# Patient Record
Sex: FEMALE | Race: ASIAN
[De-identification: names, ages, dates, MRNs, and addresses within clinical notes are randomized per-mention and may not be internally consistent; named-entity substitution may affect disease eponyms.]

---

## 2017-11-19 ENCOUNTER — HEALTH MAINTENANCE LETTER (OUTPATIENT)
Age: 42
End: 2017-11-19

## 2025-04-15 ENCOUNTER — HOSPITAL ENCOUNTER (INPATIENT)
Facility: HOSPITAL | Age: 50
DRG: 641 | End: 2025-04-15
Attending: EMERGENCY MEDICINE

## 2025-04-15 ENCOUNTER — APPOINTMENT (OUTPATIENT)
Dept: RADIOLOGY | Facility: HOSPITAL | Age: 50
DRG: 641 | End: 2025-04-15
Attending: EMERGENCY MEDICINE

## 2025-04-15 ENCOUNTER — APPOINTMENT (OUTPATIENT)
Dept: CT IMAGING | Facility: HOSPITAL | Age: 50
DRG: 641 | End: 2025-04-15
Attending: EMERGENCY MEDICINE

## 2025-04-15 DIAGNOSIS — M79.10 MYALGIA: ICD-10-CM

## 2025-04-15 DIAGNOSIS — R51.9 ACUTE INTRACTABLE HEADACHE, UNSPECIFIED HEADACHE TYPE: ICD-10-CM

## 2025-04-15 DIAGNOSIS — R79.89 ELEVATED BLOOD KETONE BODY LEVEL: ICD-10-CM

## 2025-04-15 DIAGNOSIS — R94.31 LONG QT INTERVAL: ICD-10-CM

## 2025-04-15 DIAGNOSIS — E16.2 HYPOGLYCEMIA: ICD-10-CM

## 2025-04-15 DIAGNOSIS — E87.29 HIGH ANION GAP METABOLIC ACIDOSIS: ICD-10-CM

## 2025-04-15 DIAGNOSIS — E87.20 LACTIC ACIDOSIS: ICD-10-CM

## 2025-04-15 DIAGNOSIS — K21.00 GASTROESOPHAGEAL REFLUX DISEASE WITH ESOPHAGITIS WITHOUT HEMORRHAGE: Primary | ICD-10-CM

## 2025-04-15 PROBLEM — Z59.9 FINANCIAL DIFFICULTIES: Status: ACTIVE | Noted: 2022-07-22

## 2025-04-15 PROBLEM — R10.9 ABDOMINAL PAIN: Status: ACTIVE | Noted: 2023-06-08

## 2025-04-15 PROBLEM — R73.9 HYPERGLYCEMIA: Status: ACTIVE | Noted: 2023-06-08

## 2025-04-15 PROBLEM — T73.0XXA STARVATION KETOACIDOSIS: Status: ACTIVE | Noted: 2023-06-08

## 2025-04-15 LAB
ALBUMIN SERPL BCG-MCNC: 5.2 G/DL (ref 3.5–5.2)
ALP SERPL-CCNC: 100 U/L (ref 40–150)
ALT SERPL W P-5'-P-CCNC: 46 U/L (ref 0–50)
ANION GAP SERPL CALCULATED.3IONS-SCNC: 23 MMOL/L (ref 7–15)
ANION GAP SERPL CALCULATED.3IONS-SCNC: 34 MMOL/L (ref 7–15)
AST SERPL W P-5'-P-CCNC: ABNORMAL U/L
B-OH-BUTYR SERPL-SCNC: 5.54 MMOL/L
BASE EXCESS BLDV CALC-SCNC: -17 MMOL/L (ref -3–3)
BASOPHILS # BLD AUTO: 0.1 10E3/UL (ref 0–0.2)
BASOPHILS NFR BLD AUTO: 1 %
BILIRUB DIRECT SERPL-MCNC: <0.08 MG/DL (ref 0–0.3)
BILIRUB SERPL-MCNC: 0.5 MG/DL
BUN SERPL-MCNC: 18 MG/DL (ref 6–20)
BUN SERPL-MCNC: 21.4 MG/DL (ref 6–20)
CALCIUM SERPL-MCNC: 8.1 MG/DL (ref 8.8–10.4)
CALCIUM SERPL-MCNC: 9.5 MG/DL (ref 8.8–10.4)
CHLORIDE SERPL-SCNC: 90 MMOL/L (ref 98–107)
CHLORIDE SERPL-SCNC: 97 MMOL/L (ref 98–107)
CK SERPL-CCNC: 190 U/L (ref 26–192)
CREAT SERPL-MCNC: 0.56 MG/DL (ref 0.51–0.95)
CREAT SERPL-MCNC: 0.62 MG/DL (ref 0.51–0.95)
EGFRCR SERPLBLD CKD-EPI 2021: >90 ML/MIN/1.73M2
EGFRCR SERPLBLD CKD-EPI 2021: >90 ML/MIN/1.73M2
EOSINOPHIL # BLD AUTO: 0 10E3/UL (ref 0–0.7)
EOSINOPHIL NFR BLD AUTO: 0 %
ERYTHROCYTE [DISTWIDTH] IN BLOOD BY AUTOMATED COUNT: 13.9 % (ref 10–15)
EST. AVERAGE GLUCOSE BLD GHB EST-MCNC: 103 MG/DL
ETHANOL SERPL-MCNC: 0.02 G/DL
FLUAV RNA SPEC QL NAA+PROBE: NEGATIVE
FLUBV RNA RESP QL NAA+PROBE: NEGATIVE
GLUCOSE BLD-MCNC: 47 MG/DL (ref 70–99)
GLUCOSE BLDC GLUCOMTR-MCNC: 117 MG/DL (ref 70–99)
GLUCOSE BLDC GLUCOMTR-MCNC: 152 MG/DL (ref 70–99)
GLUCOSE BLDC GLUCOMTR-MCNC: 174 MG/DL (ref 70–99)
GLUCOSE BLDC GLUCOMTR-MCNC: 178 MG/DL (ref 70–99)
GLUCOSE BLDC GLUCOMTR-MCNC: 182 MG/DL (ref 70–99)
GLUCOSE BLDC GLUCOMTR-MCNC: 59 MG/DL (ref 70–99)
GLUCOSE BLDC GLUCOMTR-MCNC: 68 MG/DL (ref 70–99)
GLUCOSE BLDC GLUCOMTR-MCNC: 84 MG/DL (ref 70–99)
GLUCOSE SERPL-MCNC: 170 MG/DL (ref 70–99)
GLUCOSE SERPL-MCNC: 49 MG/DL (ref 70–99)
HBA1C MFR BLD: 5.2 %
HCO3 BLDV-SCNC: 10 MMOL/L (ref 21–28)
HCO3 SERPL-SCNC: 10 MMOL/L (ref 22–29)
HCO3 SERPL-SCNC: 11 MMOL/L (ref 22–29)
HCT VFR BLD AUTO: 48.8 % (ref 35–47)
HGB BLD-MCNC: 16.1 G/DL (ref 11.7–15.7)
IMM GRANULOCYTES # BLD: 0 10E3/UL
IMM GRANULOCYTES NFR BLD: 0 %
LACTATE SERPL-SCNC: 9.2 MMOL/L (ref 0.7–2)
LIPASE SERPL-CCNC: 13 U/L (ref 13–60)
LIPASE SERPL-CCNC: 13 U/L (ref 13–60)
LYMPHOCYTES # BLD AUTO: 1.4 10E3/UL (ref 0.8–5.3)
LYMPHOCYTES NFR BLD AUTO: 12 %
MAGNESIUM SERPL-MCNC: 2 MG/DL (ref 1.7–2.3)
MAGNESIUM SERPL-MCNC: 2.3 MG/DL (ref 1.7–2.3)
MCH RBC QN AUTO: 30.2 PG (ref 26.5–33)
MCHC RBC AUTO-ENTMCNC: 33 G/DL (ref 31.5–36.5)
MCV RBC AUTO: 92 FL (ref 78–100)
MONOCYTES # BLD AUTO: 0.7 10E3/UL (ref 0–1.3)
MONOCYTES NFR BLD AUTO: 6 %
NEUTROPHILS # BLD AUTO: 9.9 10E3/UL (ref 1.6–8.3)
NEUTROPHILS NFR BLD AUTO: 81 %
NRBC # BLD AUTO: 0 10E3/UL
NRBC BLD AUTO-RTO: 0 /100
O2/TOTAL GAS SETTING VFR VENT: 21 %
OXYHGB MFR BLDV: 46 % (ref 70–75)
PCO2 BLDV: 28 MM HG (ref 40–50)
PH BLDV: 7.16 [PH] (ref 7.32–7.43)
PHOSPHATE SERPL-MCNC: 3.8 MG/DL (ref 2.5–4.5)
PHOSPHATE SERPL-MCNC: 4.9 MG/DL (ref 2.5–4.5)
PLATELET # BLD AUTO: 232 10E3/UL (ref 150–450)
PO2 BLDV: 33 MM HG (ref 25–47)
POTASSIUM SERPL-SCNC: 4.9 MMOL/L (ref 3.4–5.3)
POTASSIUM SERPL-SCNC: 5.4 MMOL/L (ref 3.4–5.3)
PROT SERPL-MCNC: 9.5 G/DL (ref 6.4–8.3)
RBC # BLD AUTO: 5.33 10E6/UL (ref 3.8–5.2)
RSV RNA SPEC NAA+PROBE: NEGATIVE
SALICYLATES SERPL-MCNC: <0.3 MG/DL
SAO2 % BLDV: 46.7 % (ref 70–75)
SARS-COV-2 RNA RESP QL NAA+PROBE: NEGATIVE
SODIUM SERPL-SCNC: 131 MMOL/L (ref 135–145)
SODIUM SERPL-SCNC: 134 MMOL/L (ref 135–145)
TROPONIN T SERPL HS-MCNC: 10 NG/L
TROPONIN T SERPL HS-MCNC: 12 NG/L
WBC # BLD AUTO: 12.2 10E3/UL (ref 4–11)

## 2025-04-15 PROCEDURE — 250N000009 HC RX 250: Performed by: EMERGENCY MEDICINE

## 2025-04-15 PROCEDURE — 82550 ASSAY OF CK (CPK): CPT | Performed by: EMERGENCY MEDICINE

## 2025-04-15 PROCEDURE — 36415 COLL VENOUS BLD VENIPUNCTURE: CPT | Performed by: EMERGENCY MEDICINE

## 2025-04-15 PROCEDURE — 82010 KETONE BODYS QUAN: CPT | Performed by: EMERGENCY MEDICINE

## 2025-04-15 PROCEDURE — 74177 CT ABD & PELVIS W/CONTRAST: CPT

## 2025-04-15 PROCEDURE — 85004 AUTOMATED DIFF WBC COUNT: CPT | Performed by: EMERGENCY MEDICINE

## 2025-04-15 PROCEDURE — 83735 ASSAY OF MAGNESIUM: CPT | Performed by: INTERNAL MEDICINE

## 2025-04-15 PROCEDURE — 200N000001 HC R&B ICU

## 2025-04-15 PROCEDURE — 82962 GLUCOSE BLOOD TEST: CPT

## 2025-04-15 PROCEDURE — 82310 ASSAY OF CALCIUM: CPT | Performed by: INTERNAL MEDICINE

## 2025-04-15 PROCEDURE — 83036 HEMOGLOBIN GLYCOSYLATED A1C: CPT | Performed by: EMERGENCY MEDICINE

## 2025-04-15 PROCEDURE — 71045 X-RAY EXAM CHEST 1 VIEW: CPT

## 2025-04-15 PROCEDURE — 96367 TX/PROPH/DG ADDL SEQ IV INF: CPT

## 2025-04-15 PROCEDURE — 82805 BLOOD GASES W/O2 SATURATION: CPT | Performed by: EMERGENCY MEDICINE

## 2025-04-15 PROCEDURE — 96375 TX/PRO/DX INJ NEW DRUG ADDON: CPT

## 2025-04-15 PROCEDURE — 71275 CT ANGIOGRAPHY CHEST: CPT

## 2025-04-15 PROCEDURE — 96365 THER/PROPH/DIAG IV INF INIT: CPT

## 2025-04-15 PROCEDURE — 96376 TX/PRO/DX INJ SAME DRUG ADON: CPT

## 2025-04-15 PROCEDURE — 96361 HYDRATE IV INFUSION ADD-ON: CPT

## 2025-04-15 PROCEDURE — 80179 DRUG ASSAY SALICYLATE: CPT | Performed by: EMERGENCY MEDICINE

## 2025-04-15 PROCEDURE — 250N000013 HC RX MED GY IP 250 OP 250 PS 637: Performed by: INTERNAL MEDICINE

## 2025-04-15 PROCEDURE — 83930 ASSAY OF BLOOD OSMOLALITY: CPT | Performed by: EMERGENCY MEDICINE

## 2025-04-15 PROCEDURE — 87040 BLOOD CULTURE FOR BACTERIA: CPT | Performed by: EMERGENCY MEDICINE

## 2025-04-15 PROCEDURE — 87637 SARSCOV2&INF A&B&RSV AMP PRB: CPT | Performed by: EMERGENCY MEDICINE

## 2025-04-15 PROCEDURE — 84484 ASSAY OF TROPONIN QUANT: CPT | Performed by: EMERGENCY MEDICINE

## 2025-04-15 PROCEDURE — 83690 ASSAY OF LIPASE: CPT | Performed by: EMERGENCY MEDICINE

## 2025-04-15 PROCEDURE — 250N000011 HC RX IP 250 OP 636: Performed by: EMERGENCY MEDICINE

## 2025-04-15 PROCEDURE — 82077 ASSAY SPEC XCP UR&BREATH IA: CPT | Performed by: EMERGENCY MEDICINE

## 2025-04-15 PROCEDURE — 99222 1ST HOSP IP/OBS MODERATE 55: CPT | Performed by: INTERNAL MEDICINE

## 2025-04-15 PROCEDURE — 83735 ASSAY OF MAGNESIUM: CPT | Performed by: EMERGENCY MEDICINE

## 2025-04-15 PROCEDURE — 80307 DRUG TEST PRSMV CHEM ANLYZR: CPT | Performed by: EMERGENCY MEDICINE

## 2025-04-15 PROCEDURE — 84155 ASSAY OF PROTEIN SERUM: CPT | Performed by: EMERGENCY MEDICINE

## 2025-04-15 PROCEDURE — 80048 BASIC METABOLIC PNL TOTAL CA: CPT | Performed by: EMERGENCY MEDICINE

## 2025-04-15 PROCEDURE — 258N000003 HC RX IP 258 OP 636: Performed by: EMERGENCY MEDICINE

## 2025-04-15 PROCEDURE — 84100 ASSAY OF PHOSPHORUS: CPT | Performed by: INTERNAL MEDICINE

## 2025-04-15 PROCEDURE — 93005 ELECTROCARDIOGRAM TRACING: CPT | Performed by: EMERGENCY MEDICINE

## 2025-04-15 PROCEDURE — 258N000001 HC RX 258: Performed by: EMERGENCY MEDICINE

## 2025-04-15 PROCEDURE — 36415 COLL VENOUS BLD VENIPUNCTURE: CPT | Performed by: INTERNAL MEDICINE

## 2025-04-15 PROCEDURE — 99291 CRITICAL CARE FIRST HOUR: CPT | Mod: 25

## 2025-04-15 PROCEDURE — 70496 CT ANGIOGRAPHY HEAD: CPT

## 2025-04-15 RX ORDER — DEXTROSE MONOHYDRATE AND SODIUM CHLORIDE 5; .45 G/100ML; G/100ML
1000 INJECTION, SOLUTION INTRAVENOUS CONTINUOUS PRN
Status: DISCONTINUED | OUTPATIENT
Start: 2025-04-15 | End: 2025-04-17

## 2025-04-15 RX ORDER — VANCOMYCIN HYDROCHLORIDE 1 G/200ML
20 INJECTION, SOLUTION INTRAVENOUS ONCE
Status: DISCONTINUED | OUTPATIENT
Start: 2025-04-15 | End: 2025-04-15

## 2025-04-15 RX ORDER — AMOXICILLIN 250 MG
2 CAPSULE ORAL 2 TIMES DAILY PRN
Status: DISCONTINUED | OUTPATIENT
Start: 2025-04-15 | End: 2025-04-18 | Stop reason: HOSPADM

## 2025-04-15 RX ORDER — MORPHINE SULFATE 2 MG/ML
2 INJECTION, SOLUTION INTRAMUSCULAR; INTRAVENOUS
Status: DISCONTINUED | OUTPATIENT
Start: 2025-04-15 | End: 2025-04-18 | Stop reason: HOSPADM

## 2025-04-15 RX ORDER — SODIUM CHLORIDE 9 MG/ML
INJECTION, SOLUTION INTRAVENOUS CONTINUOUS
Status: DISCONTINUED | OUTPATIENT
Start: 2025-04-15 | End: 2025-04-16

## 2025-04-15 RX ORDER — PIPERACILLIN SODIUM, TAZOBACTAM SODIUM 3; .375 G/15ML; G/15ML
3.38 INJECTION, POWDER, LYOPHILIZED, FOR SOLUTION INTRAVENOUS ONCE
Status: COMPLETED | OUTPATIENT
Start: 2025-04-15 | End: 2025-04-15

## 2025-04-15 RX ORDER — ACETAMINOPHEN 650 MG/1
650 SUPPOSITORY RECTAL EVERY 4 HOURS PRN
Status: DISCONTINUED | OUTPATIENT
Start: 2025-04-15 | End: 2025-04-18 | Stop reason: HOSPADM

## 2025-04-15 RX ORDER — ONDANSETRON 2 MG/ML
4 INJECTION INTRAMUSCULAR; INTRAVENOUS EVERY 30 MIN PRN
Status: DISCONTINUED | OUTPATIENT
Start: 2025-04-15 | End: 2025-04-15

## 2025-04-15 RX ORDER — ONDANSETRON 4 MG/1
4 TABLET, ORALLY DISINTEGRATING ORAL EVERY 6 HOURS PRN
Status: DISCONTINUED | OUTPATIENT
Start: 2025-04-15 | End: 2025-04-18 | Stop reason: HOSPADM

## 2025-04-15 RX ORDER — LIDOCAINE 40 MG/G
CREAM TOPICAL
Status: DISCONTINUED | OUTPATIENT
Start: 2025-04-15 | End: 2025-04-18 | Stop reason: HOSPADM

## 2025-04-15 RX ORDER — IOPAMIDOL 755 MG/ML
90 INJECTION, SOLUTION INTRAVASCULAR ONCE
Status: COMPLETED | OUTPATIENT
Start: 2025-04-15 | End: 2025-04-15

## 2025-04-15 RX ORDER — ONDANSETRON 2 MG/ML
4 INJECTION INTRAMUSCULAR; INTRAVENOUS EVERY 6 HOURS PRN
Status: DISCONTINUED | OUTPATIENT
Start: 2025-04-15 | End: 2025-04-18 | Stop reason: HOSPADM

## 2025-04-15 RX ORDER — AMOXICILLIN 250 MG
1 CAPSULE ORAL 2 TIMES DAILY PRN
Status: DISCONTINUED | OUTPATIENT
Start: 2025-04-15 | End: 2025-04-18 | Stop reason: HOSPADM

## 2025-04-15 RX ORDER — ACETAMINOPHEN 325 MG/1
650 TABLET ORAL ONCE
Status: COMPLETED | OUTPATIENT
Start: 2025-04-15 | End: 2025-04-15

## 2025-04-15 RX ORDER — DEXTROSE MONOHYDRATE 25 G/50ML
25 INJECTION, SOLUTION INTRAVENOUS ONCE
Status: COMPLETED | OUTPATIENT
Start: 2025-04-15 | End: 2025-04-15

## 2025-04-15 RX ORDER — DEXTROSE MONOHYDRATE AND SODIUM CHLORIDE 5; .45 G/100ML; G/100ML
1000 INJECTION, SOLUTION INTRAVENOUS CONTINUOUS
Status: DISCONTINUED | OUTPATIENT
Start: 2025-04-15 | End: 2025-04-16

## 2025-04-15 RX ORDER — ACETAMINOPHEN 325 MG/1
650 TABLET ORAL EVERY 4 HOURS PRN
Status: DISCONTINUED | OUTPATIENT
Start: 2025-04-15 | End: 2025-04-18 | Stop reason: HOSPADM

## 2025-04-15 RX ORDER — DEXTROSE MONOHYDRATE 25 G/50ML
25-50 INJECTION, SOLUTION INTRAVENOUS
Status: DISCONTINUED | OUTPATIENT
Start: 2025-04-15 | End: 2025-04-18 | Stop reason: HOSPADM

## 2025-04-15 RX ORDER — IBUPROFEN 600 MG/1
600 TABLET, FILM COATED ORAL EVERY 6 HOURS PRN
Status: ON HOLD | COMMUNITY
Start: 2025-01-10 | End: 2025-04-18

## 2025-04-15 RX ADMIN — IOPAMIDOL 90 ML: 755 INJECTION, SOLUTION INTRAVENOUS at 22:07

## 2025-04-15 RX ADMIN — SODIUM CHLORIDE 1000 ML: 0.9 INJECTION, SOLUTION INTRAVENOUS at 21:47

## 2025-04-15 RX ADMIN — DEXTROSE MONOHYDRATE 25 ML: 25 INJECTION, SOLUTION INTRAVENOUS at 19:29

## 2025-04-15 RX ADMIN — SODIUM CHLORIDE 1250 MG: 0.9 INJECTION, SOLUTION INTRAVENOUS at 21:57

## 2025-04-15 RX ADMIN — INSULIN HUMAN 1 UNITS/HR: 1 INJECTION, SOLUTION INTRAVENOUS at 23:11

## 2025-04-15 RX ADMIN — PIPERACILLIN AND TAZOBACTAM 3.38 G: 3; .375 INJECTION, POWDER, FOR SOLUTION INTRAVENOUS at 21:29

## 2025-04-15 RX ADMIN — ONDANSETRON 4 MG: 2 INJECTION, SOLUTION INTRAMUSCULAR; INTRAVENOUS at 19:39

## 2025-04-15 RX ADMIN — ACETAMINOPHEN 650 MG: 325 TABLET ORAL at 23:15

## 2025-04-15 RX ADMIN — SODIUM CHLORIDE: 0.9 INJECTION, SOLUTION INTRAVENOUS at 19:52

## 2025-04-15 RX ADMIN — DEXTROSE AND SODIUM CHLORIDE 1000 ML: 5; 450 INJECTION, SOLUTION INTRAVENOUS at 22:32

## 2025-04-15 RX ADMIN — MORPHINE SULFATE 2 MG: 2 INJECTION, SOLUTION INTRAMUSCULAR; INTRAVENOUS at 19:58

## 2025-04-15 RX ADMIN — MORPHINE SULFATE 2 MG: 2 INJECTION, SOLUTION INTRAMUSCULAR; INTRAVENOUS at 19:37

## 2025-04-15 RX ADMIN — DEXTROSE MONOHYDRATE 25 ML: 25 INJECTION, SOLUTION INTRAVENOUS at 22:02

## 2025-04-15 ASSESSMENT — COLUMBIA-SUICIDE SEVERITY RATING SCALE - C-SSRS
6. HAVE YOU EVER DONE ANYTHING, STARTED TO DO ANYTHING, OR PREPARED TO DO ANYTHING TO END YOUR LIFE?: NO
2. HAVE YOU ACTUALLY HAD ANY THOUGHTS OF KILLING YOURSELF IN THE PAST MONTH?: NO
1. IN THE PAST MONTH, HAVE YOU WISHED YOU WERE DEAD OR WISHED YOU COULD GO TO SLEEP AND NOT WAKE UP?: NO

## 2025-04-15 ASSESSMENT — ACTIVITIES OF DAILY LIVING (ADL)
ADLS_ACUITY_SCORE: 41

## 2025-04-15 NOTE — ED TRIAGE NOTES
Pt endorses headache, neck pain, and back pain that started yesterday morning. Pt is moaning in pain in triage. Pt also endorses bilateral hand numbness and bilateral knee pain, however those have both been present for the last 2-3 years. Pt endorses light-headedness, but denies room spinning dizziness or vision changes.      Triage Assessment (Adult)       Row Name 04/15/25 1199          Triage Assessment    Airway WDL WDL        Respiratory WDL    Respiratory WDL WDL        Skin Circulation/Temperature WDL    Skin Circulation/Temperature WDL WDL        Cardiac WDL    Cardiac WDL WDL        Peripheral/Neurovascular WDL    Peripheral Neurovascular WDL WDL        Cognitive/Neuro/Behavioral WDL    Cognitive/Neuro/Behavioral WDL WDL

## 2025-04-15 NOTE — ED PROVIDER NOTES
Emergency Department Encounter     Evaluation Date & Time:   4/15/2025  6:50 PM    CHIEF COMPLAINT:  Headache, Neck Pain, and Back Pain      Triage Note:Pt endorses headache, neck pain, and back pain that started yesterday morning. Pt is moaning in pain in triage. Pt also endorses bilateral hand numbness and bilateral knee pain, however those have both been present for the last 2-3 years. Pt endorses light-headedness, but denies room spinning dizziness or vision changes.      Triage Assessment (Adult)       Row Name 04/15/25 1718          Triage Assessment    Airway WDL WDL        Respiratory WDL    Respiratory WDL WDL        Skin Circulation/Temperature WDL    Skin Circulation/Temperature WDL WDL        Cardiac WDL    Cardiac WDL WDL        Peripheral/Neurovascular WDL    Peripheral Neurovascular WDL WDL        Cognitive/Neuro/Behavioral WDL    Cognitive/Neuro/Behavioral WDL WDL                         FINAL IMPRESSION:    ICD-10-CM    1. High anion gap metabolic acidosis  E87.29       2. Elevated blood ketone body level  R79.89       3. Hypoglycemia  E16.2       4. Acute intractable headache, unspecified headache type  R51.9       5. Myalgia  M79.10       6. Lactic acidosis  E87.20     rule out septic shock      7. Long QT interval  R94.31           Impression and Plan     ED COURSE & MEDICAL DECISION MAKING:      ED Course as of 04/16/25 0008   Tue Apr 15, 2025   1932 So, I reviewed the patient's chart.  History from her is difficult not just because of language barrier but because she is having some difficulty expressing what she is feeling.  It sounds like the symptoms are new within the last couple of days with headache and at least cough and cold so this may be sort of an infectious syndrome but she demonstrates no signs of meningismus.    She is certainly tachypneic and appears uncomfortable and so do have to consider the possibility of DKA but I had the nursing staff check her blood sugar and if anything  is low.  Her EKG was just handed to me and has quite a late peaking T wave suspicious for acutely low calcium and/or magnesium.  Those are being checked.  However, with her headache and high blood pressure do also have to consider something like subarachnoid hemorrhage or intracranial hemorrhage.  Her neuro examination however is unremarkable.  She has no focal findings on it.  And, she is quite awake and I saw her change close independently standing at the bedside and to climb into bed as she is quite a petite woman and had to actually climb up and into bed without difficulty so I think an acute neurologic disorder is unlikely but the headache is more likely a result of something else.  She does appear little bit dehydrated but prior to head and imaging with her symptoms I would be slow on giving fluids.    Differential is not limited to the above but will start with that evaluation.  I think like very likely the patient may need to be admitted to the hospital for new onset of symptoms unless she improves markedly or if we find an obvious cause like influenza or COVID.   2006 Patient's blood sugar was actually a bit low so she got a little bump of dextrose which brought her blood sugar but since then her pH has come back quite low.  It does not appear to be respiratory as her pCO2 is adequate but her bicarbonate is low.  She tells me she only takes ibuprofen for her headaches but I will add on salicylate level.  Really not respiratory alkalosis, though.  Appears to be primarily metabolic.   2012 Labs returning with elevated lactic acid.  No source of infection found but as she has a lactic acidosis in the setting of complaint of all over body aches and pains, will call code sepsis and started on antibiotics for undifferentiated sepsis then continue the plan for CAT scan of her head with follow-through throughout the rest of her body.  She demonstrates no signs of meningitis.  I did review her chart and found the  admission in 2023 which was very similar and they did consult endocrine who felt this was nondiabetic anion gap acidosis.  Per her son she does not drink alcohol.   2018 No evidence of uremia, toxic alcohols, or even clear cause of her lactic acidosis.   2056 Patient is in CT now.  Will see therefore shortly if there is any space-occupying lesion in her brain or signs of hydrocephalus.  If not we will discuss with nephrology fluids recommended.   2101 Previous endocrine consult notesfrom that hospitalization initial note in 06/2023:  Diabetes history: NEW Diabetes - presenting with metabolic acidosis with glucose of 142.  NO History of recent SGLT-2 inh.  Glucose peak low 200s. And A1C came back at 5.1%.    Inpatient glucose: received glargine 13 & DKA protocol now off insulin drip   Outpatient medication regimen: no diabetes meds.  Only tylenol at home.       However, in the end they did not discharge on insulin and the final diagnosis was not officially made for diabetes   2128 I reviewed her CT scan of her head.  No obvious brain occupying lesion or signs of severe hydrocephalus so will be given additional fluid bolus.  I discussed the case with Dr. Hutchinson with critical care and she agrees with starting the patient on both insulin and dextrose for what may end up being euglycemic DKA or even if not she likely needs both to close the gap.  Then I spoke with Dr. Harsh Mendieta with nephrology and he agrees this is a very odd presentation diagnosis of diabetes is not clear but with the ketones he agrees with starting on dextrose and then as she did well on last admission with the insulin protocol and fluids would agree with doing those as well.  We discussed that this may end up being an atypical derangement of her hepatic storage pathways and may need referral to a specialist for that.   2238 I spoke with the hospitalist.  They agree with continuing insulin drip for now.  They are putting in additional orders.  They  agree with admitting to the ICU.  She remains as a rule out sepsis.    Current to reexamination for perfusion is not changed from arrival.    I have reviewed the radiology results above as noted, but final radiology read of the CAT scan of chest abdomen pelvis as well as the head and neck is still pending.  Hospitalist is aware and will continue to watch for those but the patient is going to the ICU regardless.       At the conclusion of the encounter I discussed the results of all the tests and the disposition. The questions were answered. The patient or family acknowledged understanding and was agreeable with the care plan.          60 minutes of critical care time        MEDICATIONS GIVEN IN THE EMERGENCY DEPARTMENT:  Medications   sodium chloride 0.9 % infusion (0 mLs Intravenous Stopped 4/15/25 2146)   ondansetron (ZOFRAN) injection 4 mg (4 mg Intravenous $Given 4/15/25 1939)   morphine (PF) injection 2 mg (2 mg Intravenous $Given 4/15/25 1958)   vancomycin (VANCOCIN) 1,250 mg in 0.9% NaCl 262.5 mL intermittent infusion (1,250 mg Intravenous $New Bag 4/15/25 2157)   dextrose 5% and 0.45% NaCl infusion (1,000 mLs Intravenous $New Bag 4/15/25 2232)   dextrose 50 % injection 25-50 mL (25 mLs Intravenous $Given 4/15/25 2202)   dextrose 5% and 0.45% NaCl infusion (0 mLs Intravenous Hold 4/15/25 2211)   insulin regular (MYXREDLIN) 1 unit/mL infusion (has no administration in time range)   dextrose 50 % injection 25 mL (25 mLs Intravenous $Given 4/15/25 1929)   piperacillin-tazobactam (ZOSYN) 3.375 g vial to attach to  mL bag (0 g Intravenous Stopped 4/15/25 2212)   sodium chloride 0.9% BOLUS 1,000 mL (1,000 mLs Intravenous $New Bag 4/15/25 2147)   iopamidol (ISOVUE-370) solution 90 mL (90 mLs Intravenous $Given 4/15/25 2207)       NEW PRESCRIPTIONS STARTED AT TODAY'S ED VISIT:  Current Discharge Medication List          HPI     HPI     Naw Put is a 49 year old female with a pertinent history of chronic pain in  joints who presents to this ED with her sons for evaluation of headache, body aches, and joint pains .  Seen with xiomara romeroer via telemedicine.  She notes she doesn't get headaches every day but often.  She has ibuprofen 800mg per her son to take for her headaches.  She took her medicine for this at about 1pm .  Headache is bilateral temporal area but otherwise through the  she tells me it is hard to describe her headaches and when they occur.  She confirms this one started this am.  She sees her pmd for this issue, Dr. Cervantes.    She has numbness and tingling on hands and legs and aching pain in her legs for which she wears knee braces that she is wearing today.    Per her son, the joint pains/bodynumbness is common but the headache is a bit new.  She fowler shave a scratchy throat and a bit of a cough, but is unclear if they are related, but they are also new in the last couple of days with the headache.  She notes no known sick contacts or travel.    She has an admission previously for hyperglycemia/dka without formal diagnosis of diabetes per her office visit from Dr Cervantes on 8/9/24 on my review.    REVIEW OF SYSTEMS:  Review of Systems  remainder of systems are all otherwise negative.        Medical History     No past medical history on file.per office visit hypokalemia, chronic knee /joint pain, hyperglycemia with dka previously.  Contraceptives.    No past surgical history on file.    No family history on file.    Social History     Tobacco Use    Smoking status: Every Day     Types: Cigarettes       No current outpatient medications on file.      Physical Exam     First Vitals:  Patient Vitals for the past 24 hrs:   BP Temp Temp src Pulse Resp SpO2 Height Weight   04/15/25 2220 (!) 141/67 -- -- 98 27 100 % -- --   04/15/25 2210 135/67 -- -- 101 21 99 % -- --   04/15/25 2130 (!) 148/73 -- -- 95 (!) 31 100 % -- --   04/15/25 2020 (!) 146/82 -- -- 104 22 98 % -- --   04/15/25 2000 (!) 154/87 -- --  106 (!) 31 98 % -- --   04/15/25 1940 (!) 145/81 -- -- (!) 131 (!) 42 98 % -- --   04/15/25 1930 (!) 155/90 -- -- 102 22 100 % -- --   04/15/25 1920 (!) 141/81 -- -- 107 -- 99 % -- --   04/15/25 1900 (!) 183/85 -- -- 109 -- 98 % -- --   04/15/25 1716 (!) 155/92 98.2  F (36.8  C) Oral 115 20 97 % 1.524 m (5') 52.2 kg (115 lb)       PHYSICAL EXAM:   Constitutional:   initially not moaning, and following instructions to change clothes at bedside, and then climbed into bed without assistance and showing good balance.  Once in bed and we began speaking about her symptoms she was moaning and showing me her b head pain.     HENT:  Normocephalic, posterior pharynx wnl, mucous membranes moist and dark pink   Eyes:  PERRL, EOMI, Conjunctiva normal, No discharge, no scleral icterus.  Respiratory:  Breathing easily, cta but tachypneic, moaning  Cardiovascular:  rrr nl s1s2 0 murmurs, rubs, or gallops.  Peripheral pulses dp, pt, and radial are wnl.  no peripheral edema   GI:  Bowel sounds normal, Soft, No tenderness, No flank tenderness, nondistended.  :No CVA tenderness.   Musculoskeletal:  Moves all extremities.  No erythematous or swollen major joints,   Integument:  normal color, not diaphoretic, no rash.  Neurologic:  Alert & oriented x 3, Normal motor function, Normal sensory function, No focal deficits noted. Normal speech.  Cn 2-12 intact.  Nihs score of zero.  Baseline level at time of her evaluation at bedside 19:20 pm  Psychiatric:  Affect flat, Judgment normal, Mood normal.     Results     LAB AND RADIOLOGY:  All pertinent labs reviewed and interpreted  Results for orders placed or performed during the hospital encounter of 04/15/25   CTA Head Neck with Contrast     Status: None    Narrative    EXAM: CTA HEAD NECK W CONTRAST  LOCATION: [facility]  DATE: 4/15/2025    INDICATION: Headache, n v   COMPARISON: None.  CONTRAST: 90mL ISOVUE 370  TECHNIQUE: Head and neck CT angiogram with IV  contrast. Noncontrast head CT followed by axial helical CT images of the head and neck vessels obtained during the arterial phase of intravenous contrast administration. Axial 2D reconstructed images and   multiplanar 3D MIP reconstructed images of the head and neck vessels were performed by the technologist. Dose reduction techniques were used. All stenosis measurements made according to NASCET criteria unless otherwise specified.    FINDINGS:   NONCONTRAST HEAD CT:   INTRACRANIAL CONTENTS: No intracranial hemorrhage, extraaxial collection, or mass effect.  No CT evidence of acute infarct. Mild volume loss and presumed chronic small vessel ischemia.     VISUALIZED ORBITS/SINUSES/MASTOIDS: No intraorbital abnormality. No paranasal sinus mucosal disease. No middle ear or mastoid effusion.    BONES/SOFT TISSUES: No acute abnormality.    HEAD CTA: Limited by venous contamination.  ANTERIOR CIRCULATION: No stenosis/occlusion, aneurysm, or high flow vascular malformation. Standard [place] anatomy.    POSTERIOR CIRCULATION: No stenosis/occlusion, aneurysm, or high flow vascular malformation. Balanced vertebral arteries supply a normal basilar artery.     DURAL VENOUS SINUSES: Expected enhancement of the major dural venous sinuses.    NECK CTA:  RIGHT CAROTID: No measurable stenosis or dissection.    LEFT CAROTID: No measurable stenosis or dissection.    VERTEBRAL ARTERIES: No focal stenosis or dissection. Balanced vertebral arteries.    AORTIC ARCH: Classic aortic arch anatomy with no significant stenosis at the origin of the great vessels.    NONVASCULAR STRUCTURES: Unremarkable.      Impression    IMPRESSION:   HEAD CT:  1.  No acute intracranial abnormality    2.  Mild age-related and chronic ischemic changes.    HEAD CTA:   1.  Limited by venous contamination. No definite high-grade stenosis, branch occlusion, or aneurysm.    NECK CTA:  1.  Normal neck CTA.   XR Chest Port 1 View     Status: None    Narrative     EXAM: XR CHEST PORT 1 VIEW  LOCATION: [facility]  DATE: 4/15/2025    INDICATION: dyspnea  COMPARISON: Chest radiograph and CT chest 6/7/2023      Impression    IMPRESSION: No focal airspace opacity or edema. No pleural effusion or pneumothorax. Stable heart size and mediastinal contours.   CT Chest Pulmonary Embolism w Contrast     Status: None    Narrative    EXAM: CT CHEST PULMONARY EMBOLISM W CONTRAST, CT ABDOMEN PELVIS W CONTRAST  LOCATION: [facility]  DATE: 04/15/2025    INDICATION: High anion gap. Lactic acidosis.  COMPARISON: CTA chest, abdomen, and pelvis without/with IV contrast dissection protocol 06/07/2023.  TECHNIQUE: CT chest pulmonary angiogram during arterial phase injection of IV contrast. Multiplanar reformats and MIP reconstructions were performed. Dose reduction techniques were used. CT scan of the abdomen and pelvis was performed following injection   of IV contrast. Multiplanar reformats were obtained. Dose reduction techniques were used.  CONTRAST: 90 mL Isovue 370 IV.    FINDINGS:  ANGIOGRAM CHEST: Pulmonary arteries are normal caliber and negative for pulmonary emboli. Thoracic aorta is negative for dissection. No CT evidence of right heart strain.    LUNGS AND PLEURA: Minor dependent atelectasis in the posterior costophrenic angles. Both lungs are otherwise clear. No pleural effusion on either side.    MEDIASTINUM/AXILLAE: Normal cardiac size. No pericardial effusion. No suspicious adenopathy. Resolved mild thickening of the distal esophagus suggested previously. Trachea is midline.    CORONARY ARTERY CALCIFICATION: None.    HEPATOBILIARY: Diffusely fatty infiltrated enlarged liver, length of the right lobe measures 18 cm (image 34, series 9), unchanged. Tiny hypodense cysts or hemangiomas in the liver, largest in the lateral segment of the left lobe measuring 0.7 cm (image   26, series 5), no specific followup required. Patent hepatic  and portal veins. No calcified gallstones, biliary dilatation, or adjacent inflammation.    PANCREAS: Normal.    SPLEEN: Normal.    ADRENAL GLANDS: Normal.    KIDNEYS/BLADDER: No urinary tract calculi. Both kidneys are well-perfused without hydronephrosis or hydroureter. Normal urinary bladder.    BOWEL: Normal appendix. No mechanical bowel obstruction, free gas or free fluid.    LYMPH NODES: No suspicious abdominopelvic adenopathy.    VASCULATURE: Normal-caliber distal abdominal aorta measuring 1.5 x 1.6 cm (image 93, series 5). Normal-caliber IVC. Mesenteric and bilateral renal vessels are well-perfused.    PELVIC ORGANS: Normal uterus and both ovaries. Normal appendix. No adenopathy or free fluid.    MUSCULOSKELETAL: Mild degenerative changes lower lumbar spine and both SI joints.      Impression    IMPRESSION:  1.  No pulmonary emboli on either side. Lungs are otherwise clear. No adenopathy or effusion.    2.  Normal-caliber aorta in the chest, abdomen, and pelvis. No aneurysm or dissection. Downstream branches are well-perfused. Normal cardiac size.    3.  Diffusely fatty infiltrated enlarged liver with a few hypodense cysts or hemangiomas, which require no specific followup.    4.  No mechanical bowel obstruction, free gas or free fluid. Normal appendix.   CT Abdomen Pelvis w Contrast     Status: None    Narrative    EXAM: CT CHEST PULMONARY EMBOLISM W CONTRAST, CT ABDOMEN PELVIS W CONTRAST  LOCATION: [facility]  DATE: 04/15/2025    INDICATION: High anion gap. Lactic acidosis.  COMPARISON: CTA chest, abdomen, and pelvis without/with IV contrast dissection protocol 06/07/2023.  TECHNIQUE: CT chest pulmonary angiogram during arterial phase injection of IV contrast. Multiplanar reformats and MIP reconstructions were performed. Dose reduction techniques were used. CT scan of the abdomen and pelvis was performed following injection   of IV contrast. Multiplanar reformats were obtained.  Dose reduction techniques were used.  CONTRAST: 90 mL Isovue 370 IV.    FINDINGS:  ANGIOGRAM CHEST: Pulmonary arteries are normal caliber and negative for pulmonary emboli. Thoracic aorta is negative for dissection. No CT evidence of right heart strain.    LUNGS AND PLEURA: Minor dependent atelectasis in the posterior costophrenic angles. Both lungs are otherwise clear. No pleural effusion on either side.    MEDIASTINUM/AXILLAE: Normal cardiac size. No pericardial effusion. No suspicious adenopathy. Resolved mild thickening of the distal esophagus suggested previously. Trachea is midline.    CORONARY ARTERY CALCIFICATION: None.    HEPATOBILIARY: Diffusely fatty infiltrated enlarged liver, length of the right lobe measures 18 cm (image 34, series 9), unchanged. Tiny hypodense cysts or hemangiomas in the liver, largest in the lateral segment of the left lobe measuring 0.7 cm (image   26, series 5), no specific followup required. Patent hepatic and portal veins. No calcified gallstones, biliary dilatation, or adjacent inflammation.    PANCREAS: Normal.    SPLEEN: Normal.    ADRENAL GLANDS: Normal.    KIDNEYS/BLADDER: No urinary tract calculi. Both kidneys are well-perfused without hydronephrosis or hydroureter. Normal urinary bladder.    BOWEL: Normal appendix. No mechanical bowel obstruction, free gas or free fluid.    LYMPH NODES: No suspicious abdominopelvic adenopathy.    VASCULATURE: Normal-caliber distal abdominal aorta measuring 1.5 x 1.6 cm (image 93, series 5). Normal-caliber IVC. Mesenteric and bilateral renal vessels are well-perfused.    PELVIC ORGANS: Normal uterus and both ovaries. Normal appendix. No adenopathy or free fluid.    MUSCULOSKELETAL: Mild degenerative changes lower lumbar spine and both SI joints.      Impression    IMPRESSION:  1.  No pulmonary emboli on either side. Lungs are otherwise clear. No adenopathy or effusion.    2.  Normal-caliber aorta in the chest, abdomen, and pelvis. No  aneurysm or dissection. Downstream branches are well-perfused. Normal cardiac size.    3.  Diffusely fatty infiltrated enlarged liver with a few hypodense cysts or hemangiomas, which require no specific followup.    4.  No mechanical bowel obstruction, free gas or free fluid. Normal appendix.   Basic metabolic panel     Status: Abnormal   Result Value Ref Range    Sodium 134 (L) 135 - 145 mmol/L    Potassium 5.4 (H) 3.4 - 5.3 mmol/L    Chloride 90 (L) 98 - 107 mmol/L    Carbon Dioxide (CO2) 10 (LL) 22 - 29 mmol/L    Anion Gap 34 (H) 7 - 15 mmol/L    Urea Nitrogen 21.4 (H) 6.0 - 20.0 mg/dL    Creatinine 0.62 0.51 - 0.95 mg/dL    GFR Estimate >90 >60 mL/min/1.73m2    Calcium 9.5 8.8 - 10.4 mg/dL    Glucose 49 (LL) 70 - 99 mg/dL   Hepatic function panel     Status: Abnormal   Result Value Ref Range    Protein Total 9.5 (H) 6.4 - 8.3 g/dL    Albumin 5.2 3.5 - 5.2 g/dL    Bilirubin Total 0.5 <=1.2 mg/dL    Alkaline Phosphatase 100 40 - 150 U/L    AST      ALT 46 0 - 50 U/L    Bilirubin Direct <0.08 0.00 - 0.30 mg/dL   Lipase     Status: Normal   Result Value Ref Range    Lipase 13 13 - 60 U/L   Troponin T, High Sensitivity     Status: Normal   Result Value Ref Range    Troponin T, High Sensitivity 12 <=14 ng/L   Magnesium     Status: Normal   Result Value Ref Range    Magnesium 2.3 1.7 - 2.3 mg/dL   Blood gas venous     Status: Abnormal   Result Value Ref Range    pH Venous 7.16 (LL) 7.32 - 7.43    pCO2 Venous 28 (L) 40 - 50 mm Hg    pO2 Venous 33 25 - 47 mm Hg    Bicarbonate Venous 10 (LL) 21 - 28 mmol/L    Base Excess/Deficit Venous -17.0 (L) -3.0 - 3.0 mmol/L    FIO2 21     Oxyhemoglobin Venous 46 (L) 70 - 75 %    O2 Sat, Venous 46.7 (L) 70.0 - 75.0 %    Glucose 47 (LL) 70 - 99 mg/dL    Lactic Acid 9.2 (HH) 0.7 - 2.0 mmol/L    Narrative    In healthy individuals, oxyhemoglobin (O2Hb) and oxygen saturation (SO2) are approximately equal. In the presence of dyshemoglobins, oxyhemoglobin can be considerably lower than  oxygen saturation.   Influenza A/B, RSV and SARS-CoV2 PCR (COVID-19) Nasopharyngeal     Status: Normal    Specimen: Nasopharyngeal; Swab   Result Value Ref Range    Influenza A PCR Negative Negative    Influenza B PCR Negative Negative    RSV PCR Negative Negative    SARS CoV2 PCR Negative Negative    Narrative    Testing was performed using the Xpert Xpress CoV2/Flu/RSV Assay on the [employer] GeneXpert Instrument. This test should be ordered for the detection of SARS-CoV2, influenza, and RSV viruses in individuals with signs and symptoms of respiratory tract infection. This test is for in vitro diagnostic use under the [place] FDA for laboratories certified under CLIA to perform high or moderate complexity testing. This test has been [place] FDA cleared. A negative result does not rule out the presence of PCR inhibitors in the specimen or target RNA in concentration below the limit of detection for the assay. If only one viral target is positive but coinfection with multiple targets is suspected, the sample should be re-tested with another FDA cleared, approved, or authorized test, if coninfection would change clinical management. This test was validated by the [facility]. These laboratories are certified under the Clinical Laboratory Improvement Amendments of 1988 (CLIA-88) as qualified to perfom high complexity laboratory testing.   CBC with platelets and differential     Status: Abnormal   Result Value Ref Range    WBC Count 12.2 (H) 4.0 - 11.0 10e3/uL    RBC Count 5.33 (H) 3.80 - 5.20 10e6/uL    Hemoglobin 16.1 (H) 11.7 - 15.7 g/dL    Hematocrit 48.8 (H) 35.0 - 47.0 %    MCV 92 78 - 100 fL    MCH 30.2 26.5 - 33.0 pg    MCHC 33.0 31.5 - 36.5 g/dL    RDW 13.9 10.0 - 15.0 %    Platelet Count 232 150 - 450 10e3/uL    % Neutrophils 81 %    % Lymphocytes 12 %    % Monocytes 6 %    % Eosinophils 0 %    % Basophils 1 %    % Immature Granulocytes 0 %    NRBCs per 100 WBC 0 <1 /100    Absolute Neutrophils 9.9 (H)  1.6 - 8.3 10e3/uL    Absolute Lymphocytes 1.4 0.8 - 5.3 10e3/uL    Absolute Monocytes 0.7 0.0 - 1.3 10e3/uL    Absolute Eosinophils 0.0 0.0 - 0.7 10e3/uL    Absolute Basophils 0.1 0.0 - 0.2 10e3/uL    Absolute Immature Granulocytes 0.0 <=0.4 10e3/uL    Absolute NRBCs 0.0 10e3/uL   Glucose by meter     Status: Abnormal   Result Value Ref Range    GLUCOSE BY METER POCT 59 (L) 70 - 99 mg/dL   Glucose by meter     Status: Abnormal   Result Value Ref Range    GLUCOSE BY METER POCT 182 (H) 70 - 99 mg/dL   Salicylate level     Status: Normal   Result Value Ref Range    Salicylate <0.3   mg/dL   Alcohol level blood     Status: Abnormal   Result Value Ref Range    Alcohol ethyl 0.02 (H) <=0.01 g/dL   CK total     Status: Normal   Result Value Ref Range     26 - 192 U/L   Ketone Beta-Hydroxybutyrate Quantitative     Status: Abnormal   Result Value Ref Range    Ketone (Beta-Hydroxybutyrate) Quantitative 5.54 (HH) <=0.30 mmol/L   Glucose by meter     Status: Abnormal   Result Value Ref Range    GLUCOSE BY METER POCT 117 (H) 70 - 99 mg/dL   Troponin T, High Sensitivity     Status: Normal   Result Value Ref Range    Troponin T, High Sensitivity 10 <=14 ng/L   Hemoglobin A1c     Status: Normal   Result Value Ref Range    Estimated Average Glucose 103 <117 mg/dL    Hemoglobin A1C 5.2 <5.7 %   Phosphorus     Status: Abnormal   Result Value Ref Range    Phosphorus 4.9 (H) 2.5 - 4.5 mg/dL   Lipase     Status: Normal   Result Value Ref Range    Lipase 13 13 - 60 U/L   Glucose by meter     Status: Normal   Result Value Ref Range    GLUCOSE BY METER POCT 84 70 - 99 mg/dL   Glucose by meter     Status: Abnormal   Result Value Ref Range    GLUCOSE BY METER POCT 68 (L) 70 - 99 mg/dL   Glucose by meter     Status: Abnormal   Result Value Ref Range    GLUCOSE BY METER POCT 178 (H) 70 - 99 mg/dL   Basic metabolic panel     Status: Abnormal   Result Value Ref Range    Sodium 131 (L) 135 - 145 mmol/L    Potassium 4.9 3.4 - 5.3 mmol/L     Chloride 97 (L) 98 - 107 mmol/L    Carbon Dioxide (CO2) 11 (L) 22 - 29 mmol/L    Anion Gap 23 (H) 7 - 15 mmol/L    Urea Nitrogen 18.0 6.0 - 20.0 mg/dL    Creatinine 0.56 0.51 - 0.95 mg/dL    GFR Estimate >90 >60 mL/min/1.73m2    Calcium 8.1 (L) 8.8 - 10.4 mg/dL    Glucose 170 (H) 70 - 99 mg/dL   Magnesium     Status: Normal   Result Value Ref Range    Magnesium 2.0 1.7 - 2.3 mg/dL   Phosphorus     Status: Normal   Result Value Ref Range    Phosphorus 3.8 2.5 - 4.5 mg/dL   Glucose by meter     Status: Abnormal   Result Value Ref Range    GLUCOSE BY METER POCT 152 (H) 70 - 99 mg/dL   Glucose by meter     Status: Abnormal   Result Value Ref Range    GLUCOSE BY METER POCT 174 (H) 70 - 99 mg/dL   CBC with platelets differential     Status: Abnormal    Narrative    The following orders were created for panel order CBC with platelets differential.  Procedure                               Abnormality         Status                     ---------                               -----------         ------                     CBC with platelets and ...[[id number]]  Abnormal            Final result                 Please view results for these tests on the individual orders.   Urine Drug Screen     Status: None (In process)    Narrative    The following orders were created for panel order Urine Drug Screen.  Procedure                               Abnormality         Status                     ---------                               -----------         ------                     Urine Drug Screen Panel[[id number]]                         In process                   Please view results for these tests on the individual orders.         ECG:    Performed at: 1931    Impression: nsr rate of 98, late peaking t wave with long qtc concerning for electrolyte imbalance.  Pr 140ms, qrs 82ms, qtc 520ms, prt axes 76 80 37.  No previous availablefor comparison.    I have independently reviewed and interpreted the EKS(s) documented  above    PROCEDURES:  Procedures:      [facility] Documentation     Medical Decision Making    Admit.    MIPS (CTPE, Dental pain, Kelley, Sinusitis, Asthma/COPD, Head Trauma): Not Applicable    SEPSIS: pt has lactic acidosis and myalgias, but no source of infection found.  Alternate cause of lactic acidosis is more likely.  Perfusion is adequate and fluids given as noted above.  Septic shock diagnosis not yet confirmed as no source of infection found.    Aliza Chacon MD  Emergency Medicine  [facility] EMERGENCY DEPARTMENT         Aliza Chacon MD  04/15/25 [id number]       Aliza Chacon MD  04/16/25 [id number]

## 2025-04-16 LAB
AMPHETAMINES UR QL SCN: ABNORMAL
ANION GAP SERPL CALCULATED.3IONS-SCNC: 16 MMOL/L (ref 7–15)
ANION GAP SERPL CALCULATED.3IONS-SCNC: 9 MMOL/L (ref 7–15)
ATRIAL RATE - MUSE: 93 BPM
ATRIAL RATE - MUSE: 98 BPM
BARBITURATES UR QL SCN: ABNORMAL
BENZODIAZ UR QL SCN: ABNORMAL
BUN SERPL-MCNC: 10 MG/DL (ref 6–20)
BUN SERPL-MCNC: 16.5 MG/DL (ref 6–20)
BZE UR QL SCN: ABNORMAL
CALCIUM SERPL-MCNC: 7.7 MG/DL (ref 8.8–10.4)
CALCIUM SERPL-MCNC: 7.9 MG/DL (ref 8.8–10.4)
CANNABINOIDS UR QL SCN: ABNORMAL
CHLORIDE SERPL-SCNC: 105 MMOL/L (ref 98–107)
CHLORIDE SERPL-SCNC: 99 MMOL/L (ref 98–107)
CREAT SERPL-MCNC: 0.43 MG/DL (ref 0.51–0.95)
CREAT SERPL-MCNC: 0.46 MG/DL (ref 0.51–0.95)
DIASTOLIC BLOOD PRESSURE - MUSE: 90 MMHG
DIASTOLIC BLOOD PRESSURE - MUSE: NORMAL MMHG
EGFRCR SERPLBLD CKD-EPI 2021: >90 ML/MIN/1.73M2
EGFRCR SERPLBLD CKD-EPI 2021: >90 ML/MIN/1.73M2
ERYTHROCYTE [DISTWIDTH] IN BLOOD BY AUTOMATED COUNT: 13.7 % (ref 10–15)
FENTANYL UR QL: ABNORMAL
GLUCOSE BLDC GLUCOMTR-MCNC: 104 MG/DL (ref 70–99)
GLUCOSE BLDC GLUCOMTR-MCNC: 111 MG/DL (ref 70–99)
GLUCOSE BLDC GLUCOMTR-MCNC: 120 MG/DL (ref 70–99)
GLUCOSE BLDC GLUCOMTR-MCNC: 130 MG/DL (ref 70–99)
GLUCOSE BLDC GLUCOMTR-MCNC: 132 MG/DL (ref 70–99)
GLUCOSE BLDC GLUCOMTR-MCNC: 135 MG/DL (ref 70–99)
GLUCOSE BLDC GLUCOMTR-MCNC: 139 MG/DL (ref 70–99)
GLUCOSE BLDC GLUCOMTR-MCNC: 145 MG/DL (ref 70–99)
GLUCOSE BLDC GLUCOMTR-MCNC: 147 MG/DL (ref 70–99)
GLUCOSE BLDC GLUCOMTR-MCNC: 154 MG/DL (ref 70–99)
GLUCOSE BLDC GLUCOMTR-MCNC: 157 MG/DL (ref 70–99)
GLUCOSE BLDC GLUCOMTR-MCNC: 166 MG/DL (ref 70–99)
GLUCOSE BLDC GLUCOMTR-MCNC: 175 MG/DL (ref 70–99)
GLUCOSE BLDC GLUCOMTR-MCNC: 177 MG/DL (ref 70–99)
GLUCOSE BLDC GLUCOMTR-MCNC: 178 MG/DL (ref 70–99)
GLUCOSE BLDC GLUCOMTR-MCNC: 186 MG/DL (ref 70–99)
GLUCOSE SERPL-MCNC: 132 MG/DL (ref 70–99)
GLUCOSE SERPL-MCNC: 175 MG/DL (ref 70–99)
HCO3 SERPL-SCNC: 14 MMOL/L (ref 22–29)
HCO3 SERPL-SCNC: 21 MMOL/L (ref 22–29)
HCT VFR BLD AUTO: 31 % (ref 35–47)
HGB BLD-MCNC: 10.5 G/DL (ref 11.7–15.7)
HOLD SPECIMEN: NORMAL
INTERPRETATION ECG - MUSE: NORMAL
INTERPRETATION ECG - MUSE: NORMAL
MCH RBC QN AUTO: 30.1 PG (ref 26.5–33)
MCHC RBC AUTO-ENTMCNC: 33.9 G/DL (ref 31.5–36.5)
MCV RBC AUTO: 89 FL (ref 78–100)
OPIATES UR QL SCN: ABNORMAL
OSMOLALITY SERPL: 299 MMOL/KG (ref 275–295)
P AXIS - MUSE: 51 DEGREES
P AXIS - MUSE: 76 DEGREES
PCP QUAL URINE (ROCHE): ABNORMAL
PLATELET # BLD AUTO: 141 10E3/UL (ref 150–450)
POTASSIUM SERPL-SCNC: 3.6 MMOL/L (ref 3.4–5.3)
POTASSIUM SERPL-SCNC: 4.1 MMOL/L (ref 3.4–5.3)
PR INTERVAL - MUSE: 138 MS
PR INTERVAL - MUSE: 140 MS
QRS DURATION - MUSE: 82 MS
QRS DURATION - MUSE: 88 MS
QT - MUSE: 408 MS
QT - MUSE: 430 MS
QTC - MUSE: 520 MS
QTC - MUSE: 534 MS
R AXIS - MUSE: 61 DEGREES
R AXIS - MUSE: 80 DEGREES
RBC # BLD AUTO: 3.49 10E6/UL (ref 3.8–5.2)
SODIUM SERPL-SCNC: 129 MMOL/L (ref 135–145)
SODIUM SERPL-SCNC: 135 MMOL/L (ref 135–145)
SYSTOLIC BLOOD PRESSURE - MUSE: 155 MMHG
SYSTOLIC BLOOD PRESSURE - MUSE: NORMAL MMHG
T AXIS - MUSE: 36 DEGREES
T AXIS - MUSE: 37 DEGREES
VENTRICULAR RATE- MUSE: 93 BPM
VENTRICULAR RATE- MUSE: 98 BPM
WBC # BLD AUTO: 6.8 10E3/UL (ref 4–11)

## 2025-04-16 PROCEDURE — 85018 HEMOGLOBIN: CPT | Performed by: INTERNAL MEDICINE

## 2025-04-16 PROCEDURE — 84100 ASSAY OF PHOSPHORUS: CPT | Performed by: INTERNAL MEDICINE

## 2025-04-16 PROCEDURE — 99232 SBSQ HOSP IP/OBS MODERATE 35: CPT | Performed by: INTERNAL MEDICINE

## 2025-04-16 PROCEDURE — 93005 ELECTROCARDIOGRAM TRACING: CPT | Performed by: INTERNAL MEDICINE

## 2025-04-16 PROCEDURE — 83735 ASSAY OF MAGNESIUM: CPT | Performed by: INTERNAL MEDICINE

## 2025-04-16 PROCEDURE — 80048 BASIC METABOLIC PNL TOTAL CA: CPT | Performed by: INTERNAL MEDICINE

## 2025-04-16 PROCEDURE — 200N000001 HC R&B ICU

## 2025-04-16 PROCEDURE — 36415 COLL VENOUS BLD VENIPUNCTURE: CPT | Performed by: INTERNAL MEDICINE

## 2025-04-16 PROCEDURE — 258N000003 HC RX IP 258 OP 636: Performed by: EMERGENCY MEDICINE

## 2025-04-16 PROCEDURE — 93010 ELECTROCARDIOGRAM REPORT: CPT | Performed by: INTERNAL MEDICINE

## 2025-04-16 PROCEDURE — 93005 ELECTROCARDIOGRAM TRACING: CPT

## 2025-04-16 PROCEDURE — 250N000012 HC RX MED GY IP 250 OP 636 PS 637: Performed by: INTERNAL MEDICINE

## 2025-04-16 PROCEDURE — 250N000013 HC RX MED GY IP 250 OP 250 PS 637: Performed by: INTERNAL MEDICINE

## 2025-04-16 PROCEDURE — 84132 ASSAY OF SERUM POTASSIUM: CPT | Performed by: INTERNAL MEDICINE

## 2025-04-16 RX ORDER — DEXTROSE MONOHYDRATE 25 G/50ML
25-50 INJECTION, SOLUTION INTRAVENOUS
Status: DISCONTINUED | OUTPATIENT
Start: 2025-04-16 | End: 2025-04-16

## 2025-04-16 RX ORDER — DEXTROSE MONOHYDRATE 25 G/50ML
25-50 INJECTION, SOLUTION INTRAVENOUS
Status: DISCONTINUED | OUTPATIENT
Start: 2025-04-16 | End: 2025-04-18 | Stop reason: HOSPADM

## 2025-04-16 RX ORDER — NICOTINE POLACRILEX 4 MG
15-30 LOZENGE BUCCAL
Status: DISCONTINUED | OUTPATIENT
Start: 2025-04-16 | End: 2025-04-16

## 2025-04-16 RX ORDER — POTASSIUM CHLORIDE 1500 MG/1
20 TABLET, EXTENDED RELEASE ORAL ONCE
Status: DISCONTINUED | OUTPATIENT
Start: 2025-04-16 | End: 2025-04-16

## 2025-04-16 RX ORDER — NICOTINE POLACRILEX 4 MG
15-30 LOZENGE BUCCAL
Status: DISCONTINUED | OUTPATIENT
Start: 2025-04-16 | End: 2025-04-18 | Stop reason: HOSPADM

## 2025-04-16 RX ADMIN — DEXTROSE AND SODIUM CHLORIDE 1000 ML: 5; 450 INJECTION, SOLUTION INTRAVENOUS at 07:08

## 2025-04-16 RX ADMIN — INSULIN GLARGINE 15 UNITS: 100 INJECTION, SOLUTION SUBCUTANEOUS at 13:42

## 2025-04-16 RX ADMIN — ACETAMINOPHEN 650 MG: 325 TABLET, FILM COATED ORAL at 13:44

## 2025-04-16 RX ADMIN — DEXTROSE AND SODIUM CHLORIDE 1000 ML: 5; 450 INJECTION, SOLUTION INTRAVENOUS at 03:09

## 2025-04-16 RX ADMIN — ACETAMINOPHEN 650 MG: 325 TABLET, FILM COATED ORAL at 19:58

## 2025-04-16 RX ADMIN — DEXTROSE AND SODIUM CHLORIDE 1000 ML: 5; .45 INJECTION, SOLUTION INTRAVENOUS at 11:57

## 2025-04-16 ASSESSMENT — ACTIVITIES OF DAILY LIVING (ADL)
ADLS_ACUITY_SCORE: 35
ADLS_ACUITY_SCORE: 38
ADLS_ACUITY_SCORE: 35
ADLS_ACUITY_SCORE: 35
ADLS_ACUITY_SCORE: 38
ADLS_ACUITY_SCORE: 42
ADLS_ACUITY_SCORE: 38
ADLS_ACUITY_SCORE: 41
ADLS_ACUITY_SCORE: 38
ADLS_ACUITY_SCORE: 38
ADLS_ACUITY_SCORE: 35
ADLS_ACUITY_SCORE: 38
ADLS_ACUITY_SCORE: 35
ADLS_ACUITY_SCORE: 35
ADLS_ACUITY_SCORE: 38
ADLS_ACUITY_SCORE: 35

## 2025-04-16 NOTE — PROGRESS NOTES
[facility]    Medicine Progress Note - Hospitalist Service    Date of Admission:  4/15/2025    Assessment & Plan   Deni France is a 49 year old female with history of  starvation ketoacidosis, latent TB, and tobacco abuse admitted on 4/15/2025 with headache, new onset type 2 diabetes and diabetic ketoacidosis.    She received IV fluid and insulin drip per DKA protocol.  DKA has resolved.    Diabetic ketoacidosis  Received insulin drip per DKA protocol  DKA has resolved  Lantus 15 units daily  Insulin sliding scale  Monitor for hypoglycemia per protocol  Patient will need glucose monitoring teaching and referral to diabetes clinic at discharge    Intractable headache  Analgesics as needed  CT brain negative  Will consider referral to neurology clinic as outpatient    Myalgias  Likely due to electrolyte imbalances  As needed Tylenol  PT and OT  Fall precautions  Monitor replace electrolytes    Hyperkalemia  Potassium of 5.4.   Has improved to 4.9    Elevated ethanol level  Patient denies alcohol consumption.  Her blood alcohol level is 0.02.  No episode of alcohol withdrawal    Diet: Moderate Consistent Carb (60 g CHO per Meal) Diet    DVT Prophylaxis: Pneumatic Compression Devices  Kelley Catheter: Not present  Lines: None     Cardiac Monitoring: ACTIVE order. Indication: ICU  Code Status: Full Code      Clinically Significant Risk Factors Present on Admission        # Hyperkalemia: Highest K = 5.4 mmol/L in last 2 days, will monitor as appropriate  # Hyponatremia: Lowest Na = 129 mmol/L in last 2 days, will monitor as appropriate  # Hypochloremia: Lowest Cl = 90 mmol/L in last 2 days, will monitor as appropriate  # Hypocalcemia: Lowest Ca = 7.7 mg/dL in last 2 days, will monitor and replace as appropriate    # Anion Gap Metabolic Acidosis: Highest Anion Gap = 34 mmol/L in last 2 days, will monitor and treat as appropriate                           Social Drivers of Health    Depression: At risk  (6/14/2024)    Received from [employer]    PHQ-2     PHQ-2 Score: 3   Tobacco Use: Medium Risk (9/13/2024)    Received from [employer]    Patient History     Smoking Tobacco Use: Former     Smokeless Tobacco Use: Never          Disposition Plan     Medically Ready for Discharge: Anticipated in 2-4 Days             Cristofer Linder MD  Hospitalist Service  [facility]  Securely message with [employer] (more info)  Text page via [employer] Paging/Directory   ______________________________________________________________________    Interval History   She reports headache but denies other acute complaints. She states she is not aware of a prior diagnosis of diabetes. Anion gap has closed. Off insulin drip.     Physical Exam   Vital Signs: Temp: 98.2  F (36.8  C) Temp src: Oral BP: (!) 148/81 Pulse: 85   Resp: 19 SpO2: 99 % O2 Device: None (Room air)    Weight: 117 lbs 8 oz    General appearance: Awake, Alert, Cooperative, not in any obvious distress and appears stated age   HEENT: Normocephalic, atraumatic, conjunctiva clear without icterus and ears without discharge  Lungs: Clear to auscultation bilaterally, no wheezing, good air exchange, normal work of breathing  Cardiovascular: Regular Rate and Rythm, normal apical impulse, normal S1 and S2, no lower extremity edema bilaterally  Abdomen: Soft, non-tender and Non-distended, active bowel sounds  Skin: Skin color, texture normal and bruising or bleeding. No rashes or lesions over face, neck, arms and legs, turgor normal.  Musculoskeletal: No bony deformities or joint tenderness. Normal ROM upon flexion & extension.   Neurologic: Alert & Oriented X 3, Facial symmetry preserved and upper & lower extremities moving well with symmetry  Psychiatric: Calm, normal eye contact and normal affect      Medical Decision Making       45 MINUTES SPENT BY ME on the date of service doing chart review, history, exam, documentation & further activities per the  note.      Data     I have personally reviewed the following data over the past 24 hrs:    12.2 (H)  \   16.1 (H)   / 232     135 105 10.0 /  147 (H)   3.6 21 (L) 0.43 (L) \     ALT: 46 AST: N/A AP: 100 TBILI: 0.5   ALB: 5.2 TOT PROTEIN: 9.5 (H) LIPASE: 13     Trop: 10 BNP: N/A     TSH: N/A T4: N/A A1C: 5.2     Procal: N/A CRP: N/A Lactic Acid: 9.2 (HH)         Imaging results reviewed over the past 24 hrs:   Recent Results (from the past 24 hours)   XR Chest Port 1 View    Narrative    EXAM: XR CHEST PORT 1 VIEW  LOCATION: [facility]  DATE: 4/15/2025    INDICATION: dyspnea  COMPARISON: Chest radiograph and CT chest 6/7/2023      Impression    IMPRESSION: No focal airspace opacity or edema. No pleural effusion or pneumothorax. Stable heart size and mediastinal contours.   CTA Head Neck with Contrast    Narrative    EXAM: CTA HEAD NECK W CONTRAST  LOCATION: [facility]  DATE: 4/15/2025    INDICATION: Headache, n v   COMPARISON: None.  CONTRAST: 90mL ISOVUE 370  TECHNIQUE: Head and neck CT angiogram with IV contrast. Noncontrast head CT followed by axial helical CT images of the head and neck vessels obtained during the arterial phase of intravenous contrast administration. Axial 2D reconstructed images and   multiplanar 3D MIP reconstructed images of the head and neck vessels were performed by the technologist. Dose reduction techniques were used. All stenosis measurements made according to NASCET criteria unless otherwise specified.    FINDINGS:   NONCONTRAST HEAD CT:   INTRACRANIAL CONTENTS: No intracranial hemorrhage, extraaxial collection, or mass effect.  No CT evidence of acute infarct. Mild volume loss and presumed chronic small vessel ischemia.     VISUALIZED ORBITS/SINUSES/MASTOIDS: No intraorbital abnormality. No paranasal sinus mucosal disease. No middle ear or mastoid effusion.    BONES/SOFT TISSUES: No acute abnormality.    HEAD CTA: Limited by venous  contamination.  ANTERIOR CIRCULATION: No stenosis/occlusion, aneurysm, or high flow vascular malformation. Standard [place] anatomy.    POSTERIOR CIRCULATION: No stenosis/occlusion, aneurysm, or high flow vascular malformation. Balanced vertebral arteries supply a normal basilar artery.     DURAL VENOUS SINUSES: Expected enhancement of the major dural venous sinuses.    NECK CTA:  RIGHT CAROTID: No measurable stenosis or dissection.    LEFT CAROTID: No measurable stenosis or dissection.    VERTEBRAL ARTERIES: No focal stenosis or dissection. Balanced vertebral arteries.    AORTIC ARCH: Classic aortic arch anatomy with no significant stenosis at the origin of the great vessels.    NONVASCULAR STRUCTURES: Unremarkable.      Impression    IMPRESSION:   HEAD CT:  1.  No acute intracranial abnormality    2.  Mild age-related and chronic ischemic changes.    HEAD CTA:   1.  Limited by venous contamination. No definite high-grade stenosis, branch occlusion, or aneurysm.    NECK CTA:  1.  Normal neck CTA.   CT Chest Pulmonary Embolism w Contrast    Narrative    EXAM: CT CHEST PULMONARY EMBOLISM W CONTRAST, CT ABDOMEN PELVIS W CONTRAST  LOCATION: [facility]  DATE: 04/15/2025    INDICATION: High anion gap. Lactic acidosis.  COMPARISON: CTA chest, abdomen, and pelvis without/with IV contrast dissection protocol 06/07/2023.  TECHNIQUE: CT chest pulmonary angiogram during arterial phase injection of IV contrast. Multiplanar reformats and MIP reconstructions were performed. Dose reduction techniques were used. CT scan of the abdomen and pelvis was performed following injection   of IV contrast. Multiplanar reformats were obtained. Dose reduction techniques were used.  CONTRAST: 90 mL Isovue 370 IV.    FINDINGS:  ANGIOGRAM CHEST: Pulmonary arteries are normal caliber and negative for pulmonary emboli. Thoracic aorta is negative for dissection. No CT evidence of right heart strain.    LUNGS AND  PLEURA: Minor dependent atelectasis in the posterior costophrenic angles. Both lungs are otherwise clear. No pleural effusion on either side.    MEDIASTINUM/AXILLAE: Normal cardiac size. No pericardial effusion. No suspicious adenopathy. Resolved mild thickening of the distal esophagus suggested previously. Trachea is midline.    CORONARY ARTERY CALCIFICATION: None.    HEPATOBILIARY: Diffusely fatty infiltrated enlarged liver, length of the right lobe measures 18 cm (image 34, series 9), unchanged. Tiny hypodense cysts or hemangiomas in the liver, largest in the lateral segment of the left lobe measuring 0.7 cm (image   26, series 5), no specific followup required. Patent hepatic and portal veins. No calcified gallstones, biliary dilatation, or adjacent inflammation.    PANCREAS: Normal.    SPLEEN: Normal.    ADRENAL GLANDS: Normal.    KIDNEYS/BLADDER: No urinary tract calculi. Both kidneys are well-perfused without hydronephrosis or hydroureter. Normal urinary bladder.    BOWEL: Normal appendix. No mechanical bowel obstruction, free gas or free fluid.    LYMPH NODES: No suspicious abdominopelvic adenopathy.    VASCULATURE: Normal-caliber distal abdominal aorta measuring 1.5 x 1.6 cm (image 93, series 5). Normal-caliber IVC. Mesenteric and bilateral renal vessels are well-perfused.    PELVIC ORGANS: Normal uterus and both ovaries. Normal appendix. No adenopathy or free fluid.    MUSCULOSKELETAL: Mild degenerative changes lower lumbar spine and both SI joints.      Impression    IMPRESSION:  1.  No pulmonary emboli on either side. Lungs are otherwise clear. No adenopathy or effusion.    2.  Normal-caliber aorta in the chest, abdomen, and pelvis. No aneurysm or dissection. Downstream branches are well-perfused. Normal cardiac size.    3.  Diffusely fatty infiltrated enlarged liver with a few hypodense cysts or hemangiomas, which require no specific followup.    4.  No mechanical bowel obstruction, free gas or free  fluid. Normal appendix.   CT Abdomen Pelvis w Contrast    Narrative    EXAM: CT CHEST PULMONARY EMBOLISM W CONTRAST, CT ABDOMEN PELVIS W CONTRAST  LOCATION: [facility]  DATE: 04/15/2025    INDICATION: High anion gap. Lactic acidosis.  COMPARISON: CTA chest, abdomen, and pelvis without/with IV contrast dissection protocol 06/07/2023.  TECHNIQUE: CT chest pulmonary angiogram during arterial phase injection of IV contrast. Multiplanar reformats and MIP reconstructions were performed. Dose reduction techniques were used. CT scan of the abdomen and pelvis was performed following injection   of IV contrast. Multiplanar reformats were obtained. Dose reduction techniques were used.  CONTRAST: 90 mL Isovue 370 IV.    FINDINGS:  ANGIOGRAM CHEST: Pulmonary arteries are normal caliber and negative for pulmonary emboli. Thoracic aorta is negative for dissection. No CT evidence of right heart strain.    LUNGS AND PLEURA: Minor dependent atelectasis in the posterior costophrenic angles. Both lungs are otherwise clear. No pleural effusion on either side.    MEDIASTINUM/AXILLAE: Normal cardiac size. No pericardial effusion. No suspicious adenopathy. Resolved mild thickening of the distal esophagus suggested previously. Trachea is midline.    CORONARY ARTERY CALCIFICATION: None.    HEPATOBILIARY: Diffusely fatty infiltrated enlarged liver, length of the right lobe measures 18 cm (image 34, series 9), unchanged. Tiny hypodense cysts or hemangiomas in the liver, largest in the lateral segment of the left lobe measuring 0.7 cm (image   26, series 5), no specific followup required. Patent hepatic and portal veins. No calcified gallstones, biliary dilatation, or adjacent inflammation.    PANCREAS: Normal.    SPLEEN: Normal.    ADRENAL GLANDS: Normal.    KIDNEYS/BLADDER: No urinary tract calculi. Both kidneys are well-perfused without hydronephrosis or hydroureter. Normal urinary bladder.    BOWEL: Normal  appendix. No mechanical bowel obstruction, free gas or free fluid.    LYMPH NODES: No suspicious abdominopelvic adenopathy.    VASCULATURE: Normal-caliber distal abdominal aorta measuring 1.5 x 1.6 cm (image 93, series 5). Normal-caliber IVC. Mesenteric and bilateral renal vessels are well-perfused.    PELVIC ORGANS: Normal uterus and both ovaries. Normal appendix. No adenopathy or free fluid.    MUSCULOSKELETAL: Mild degenerative changes lower lumbar spine and both SI joints.      Impression    IMPRESSION:  1.  No pulmonary emboli on either side. Lungs are otherwise clear. No adenopathy or effusion.    2.  Normal-caliber aorta in the chest, abdomen, and pelvis. No aneurysm or dissection. Downstream branches are well-perfused. Normal cardiac size.    3.  Diffusely fatty infiltrated enlarged liver with a few hypodense cysts or hemangiomas, which require no specific followup.    4.  No mechanical bowel obstruction, free gas or free fluid. Normal appendix.

## 2025-04-16 NOTE — PLAN OF CARE
[facility] - ICU Admission Note       Dual Skin Assessment:     Patient admitted from ED to ICU.      Comprehensive skin inspection completed by myself and Hamida Taylor RN.      Abnormal skin assessment findings: No      If yes above, LDA initiated for skin breakdown/non-blanchable erythema:  No     Provider notified: N/A     WOC consult order obtained: N/A

## 2025-04-16 NOTE — PLAN OF CARE
[facility] - ICU    RN Progress Note:            Pertinent Assessments:      Please refer to flowsheet rows for full assessment     - used  for assessment. Alert and orientedx4. Vital signs stable. NSR. Room air. 02 Sats >94%. Denies of pain with minimal discomfort on right shoulder. Requested warm pack and effective. Able to got some rest tonight.        Key Events - This Shift:     - Remains on insulin drip. Blood glucose improving.    RN Managed Protocols Ordered:  Yes  Protocols:Potassium, Magnesium, and Phosphorus  PRN'S: None  Protocols Status: Endorsed to Oncoming RN            Barriers to Discharge / Downgrade:     - DKA protocol.    Problem: Diabetic Ketoacidosis  Goal: Optimal Coping  Outcome: Progressing  Goal: Fluid and Electrolyte Balance with Absence of Ketosis  Outcome: Progressing

## 2025-04-16 NOTE — H&P
[facility]    History and Physical - Hospitalist Service       Date of Admission:  4/15/2025    Assessment & Plan    Active Problems:    Myalgia    Hypoglycemia    High anion gap metabolic acidosis    Acute intractable headache, unspecified headache type    Elevated blood ketone body level      Deni France is a 49 year old female admitted on 4/15/2025. She has a medical history significant for starvation ketoacidosis, latent TB, and tobacco abuse.  She presented to the ED with complaints of headache, neck pain, and back pain that started yesterday.  She reports severe headache and neck and back pain that started yesterday morning.  Reports that she took ibuprofen at home but did not improve her symptoms.  Reports that she had felt lightheaded but denies any spinning sensation.  She reports numbness and tingling of both arms and legs with associated weakness in the legs, for which she wears knee braces.  She denies any vision changes or focal weakness.  She denies any nausea, vomiting, diarrhea, constipation, dysuria, hematuria, melena, hematochezia, or any other new symptoms.    In the ED, blood pressures were elevated.  She was tachypneic and tachycardic. Blood glucose on presentation was 59.  CBC showed WBC count of 12.2 with hemoglobin of 16.1 and platelet count of 232.  BMP showed sodium of 134 with potassium of 5.4.  Bicarb was 10 with anion gap of 34 and chloride of 90.  BUN was 24 with creatinine of 6.2 and glucose of 49.  Magnesium was 2.3.  Hemoglobin A1c was 5.2.  Influenza, RSV, and COVID-19 were negative.  Lipase was 13.  Troponin was 12.  Salicylate level was less than 0.3.  Ethylene glycol level was 0.02.  Beta hydroxybutyrate level was 5.54.  Phosphorus was 4.9.  UDS was positive for opiates.  However, this was obtained after she had received pain medication in the ED.    # Anion gap metabolic acidosis  # Ketoacidosis: Likely due to starvation ketoacidosis.  Patient with  hypoglycemia with blood glucose of 49.  Has elevated ketoacids in elevated lactic acid levels  Also has bicarb of 10 and anion gap of 34.  Continue D5 and half NS  Continue insulin drip  Monitor BMP  Trend beta hydroxybutyrate  Keep n.p.o. until anion gap is closed and bicarb is normal.    # Intractable headache: Did not respond to ibuprofen or morphine  As needed Tylenol.  CT brain negative    # Myalgias: Likely due to electrolyte imbalances  As needed Tylenol  PT and OT  Fall precautions  Monitor replace electrolytes    # Hyperkalemia: Potassium of 5.4.   Has improved to 4.9    # Elevated ethanol level.  Patient denies alcohol consumption.  Her blood alcohol level is 0.02.          Diet: Combination Diet Regular Diet Adult    DVT Prophylaxis: Pneumatic Compression Devices  Kelley Catheter: Not present  Lines: None     Cardiac Monitoring: ACTIVE order. Indication: ICU  Code Status: Full Code      Clinically Significant Risk Factors Present on Admission        # Hyperkalemia: Highest K = 5.4 mmol/L in last 2 days, will monitor as appropriate  # Hyponatremia: Lowest Na = 131 mmol/L in last 2 days, will monitor as appropriate  # Hypochloremia: Lowest Cl = 90 mmol/L in last 2 days, will monitor as appropriate  # Hypocalcemia: Lowest Ca = 8.1 mg/dL in last 2 days, will monitor and replace as appropriate    # Anion Gap Metabolic Acidosis: Highest Anion Gap = 34 mmol/L in last 2 days, will monitor and treat as appropriate                           Disposition Plan     Medically Ready for Discharge: Anticipated in 2-4 Days           Trell Gray MD  Hospitalist Service  [facility]  Securely message with [employer] (more info)  Text page via [facility] Paging/Directory     ______________________________________________________________________    Chief Complaint   Headache, back ache, neck ache.     History is obtained from the patient, using [employer] translation service.     History of Present Illness   Naw  Román is a 49 year old female with a medical history significant for starvation ketoacidosis, latent TB, and tobacco abuse.  She presented to the ED with complaints of headache, neck pain, and back pain that started yesterday.  She reports severe headache and neck and back pain that started yesterday morning.  Reports that she took ibuprofen at home but did not improve her symptoms.  Reports that she had felt lightheaded but denies any spinning sensation.  She reports numbness and tingling of both arms and legs with associated weakness in the legs, for which she wears knee braces.  She denies any vision changes or focal weakness.  She denies any nausea, vomiting, diarrhea, constipation, dysuria, hematuria, melena, hematochezia, or any other new symptoms.          Past Medical History    No past medical history on file.    Past Surgical History   No past surgical history on file.    Prior to Admission Medications   Prior to Admission Medications   Prescriptions Last Dose Informant Patient Reported? Taking?   ibuprofen (ADVIL/MOTRIN) 600 MG tablet 4/15/2025 Evening  Yes Yes   Sig: Take 600 mg by mouth every 6 hours as needed for mild pain.      Facility-Administered Medications: None        Review of Systems    The 10 point Review of Systems is negative other than noted in the HPI or here.     Physical Exam   Vital Signs: Temp: 98.2  F (36.8  C) Temp src: Oral BP: 137/68 Pulse: 102   Resp: 24 SpO2: 98 % O2 Device: None (Room air)    Weight: 115 lbs 0 oz    General: AAOx3. Mild to moderate distress.  HEENT: NCAT, pupils are equal and round, anicteric, oral mucosa is moist.  Neck: Supple,,   Cardiac: RRR.  No murmur. No rub or gallop.  Respiratory: CTAB, no crackles, wheezes, rales, or rhonchi  Abdomen: Soft, NT, ND, + bowel sounds  Extremity: No LE edema, DP pulses palpable.   Neuro: AAO x3,   Psych: Calm and cooperative.       Medical Decision Making       >55 MINUTES SPENT BY ME on the date of service doing chart  review, history, exam, documentation & further activities per the note.      Data

## 2025-04-16 NOTE — MEDICATION SCRIBE - ADMISSION MEDICATION HISTORY
Medication Scribe Admission Medication History    Admission medication history is complete. The information provided in this note is only as accurate as the sources available at the time of the update.    Information Source(s): Patient via in-person    Pertinent Information: medication is managed by patient. Last medication taken was today at 3 pm.  Changes made to PTA medication list:  Added: None  Deleted: levonorgestrel-ethinyl estradiol   Changed: None    Allergies reviewed with patient and updates made in EHR: yes    Medication History Completed By: Aklilu Gebreyesus 4/15/2025 9:46 PM    PTA Med List   Medication Sig Last Dose/Taking    ibuprofen (ADVIL/MOTRIN) 600 MG tablet Take 600 mg by mouth every 6 hours as needed for mild pain. 4/15/2025 Evening

## 2025-04-16 NOTE — ED NOTES
IV abx delayed due to Pt be at CT for 50 mins. Started as soon as Pt returned. Only one set of blood cultures had been collected prior to the start of first abx given due to already being delayed.

## 2025-04-16 NOTE — PHARMACY-VANCOMYCIN DOSING SERVICE
Pharmacy Vancomycin Initial Note  Date of Service April 15, 2025  Patient's  1975  49 year old, female    Indication: Sepsis    Current estimated CrCl = Estimated Creatinine Clearance: 90.4 mL/min (based on SCr of 0.62 mg/dL).    Creatinine for last 3 days  4/15/2025:  7:29 PM Creatinine 0.62 mg/dL    Recent Vancomycin Level(s) for last 3 days  No results found for requested labs within last 3 days.      Vancomycin IV Administrations (past 72 hours)        No vancomycin orders with administrations in past 72 hours.                    Nephrotoxins and other renal medications (From now, onward)      Start     Dose/Rate Route Frequency Ordered Stop    04/15/25 2030  piperacillin-tazobactam (ZOSYN) 3.375 g vial to attach to  mL bag         3.375 g  over 30 Minutes Intravenous ONCE 04/15/25 2011      04/15/25 2030  vancomycin (VANCOCIN) 1,000 mg in 200 mL dextrose intermittent infusion         20 mg/kg × 52.2 kg  200 mL/hr over 1 Hours Intravenous ONCE 04/15/25 2015              Contrast Orders - past 72 hours (72h ago, onward)      None                Plan:  Start vancomycin 1250 mg IV x1  Please reconsult pharmacy if planning to continue inpatient.    ALEX SMART, [facility]

## 2025-04-17 LAB
ANION GAP SERPL CALCULATED.3IONS-SCNC: 12 MMOL/L (ref 7–15)
B-OH-BUTYR SERPL-SCNC: 0.38 MMOL/L
BACTERIA BLD CULT: NORMAL
BASE EXCESS BLDV CALC-SCNC: 3.2 MMOL/L (ref -3–3)
BUN SERPL-MCNC: 12.5 MG/DL (ref 6–20)
CALCIUM SERPL-MCNC: 8.7 MG/DL (ref 8.8–10.4)
CHLORIDE SERPL-SCNC: 106 MMOL/L (ref 98–107)
CREAT SERPL-MCNC: 0.6 MG/DL (ref 0.51–0.95)
EGFRCR SERPLBLD CKD-EPI 2021: >90 ML/MIN/1.73M2
ERYTHROCYTE [DISTWIDTH] IN BLOOD BY AUTOMATED COUNT: 13.7 % (ref 10–15)
GLUCOSE BLDC GLUCOMTR-MCNC: 109 MG/DL (ref 70–99)
GLUCOSE BLDC GLUCOMTR-MCNC: 111 MG/DL (ref 70–99)
GLUCOSE BLDC GLUCOMTR-MCNC: 131 MG/DL (ref 70–99)
GLUCOSE BLDC GLUCOMTR-MCNC: 93 MG/DL (ref 70–99)
GLUCOSE BLDC GLUCOMTR-MCNC: 95 MG/DL (ref 70–99)
GLUCOSE SERPL-MCNC: 121 MG/DL (ref 70–99)
HCO3 BLDV-SCNC: 27 MMOL/L (ref 21–28)
HCO3 SERPL-SCNC: 20 MMOL/L (ref 22–29)
HCT VFR BLD AUTO: 32.8 % (ref 35–47)
HGB BLD-MCNC: 11.1 G/DL (ref 11.7–15.7)
HOLD SPECIMEN: NORMAL
LACTATE SERPL-SCNC: 0.9 MMOL/L (ref 0.7–2)
MAGNESIUM SERPL-MCNC: 2 MG/DL (ref 1.7–2.3)
MAGNESIUM SERPL-MCNC: 2 MG/DL (ref 1.7–2.3)
MCH RBC QN AUTO: 30.4 PG (ref 26.5–33)
MCHC RBC AUTO-ENTMCNC: 33.8 G/DL (ref 31.5–36.5)
MCV RBC AUTO: 90 FL (ref 78–100)
O2/TOTAL GAS SETTING VFR VENT: 0 %
OXYHGB MFR BLDV: 60 % (ref 70–75)
PCO2 BLDV: 38 MM HG (ref 40–50)
PH BLDV: 7.46 [PH] (ref 7.32–7.43)
PHOSPHATE SERPL-MCNC: 1.5 MG/DL (ref 2.5–4.5)
PHOSPHATE SERPL-MCNC: 3.2 MG/DL (ref 2.5–4.5)
PLATELET # BLD AUTO: 128 10E3/UL (ref 150–450)
PO2 BLDV: 31 MM HG (ref 25–47)
POTASSIUM SERPL-SCNC: 3.3 MMOL/L (ref 3.4–5.3)
POTASSIUM SERPL-SCNC: 3.5 MMOL/L (ref 3.4–5.3)
POTASSIUM SERPL-SCNC: 3.6 MMOL/L (ref 3.4–5.3)
RBC # BLD AUTO: 3.65 10E6/UL (ref 3.8–5.2)
SAO2 % BLDV: 61.3 % (ref 70–75)
SODIUM SERPL-SCNC: 138 MMOL/L (ref 135–145)
WBC # BLD AUTO: 4.4 10E3/UL (ref 4–11)

## 2025-04-17 PROCEDURE — 99232 SBSQ HOSP IP/OBS MODERATE 35: CPT | Performed by: INTERNAL MEDICINE

## 2025-04-17 PROCEDURE — 87040 BLOOD CULTURE FOR BACTERIA: CPT | Performed by: INTERNAL MEDICINE

## 2025-04-17 PROCEDURE — 250N000013 HC RX MED GY IP 250 OP 250 PS 637: Performed by: INTERNAL MEDICINE

## 2025-04-17 PROCEDURE — 250N000009 HC RX 250: Performed by: INTERNAL MEDICINE

## 2025-04-17 PROCEDURE — 84100 ASSAY OF PHOSPHORUS: CPT | Performed by: INTERNAL MEDICINE

## 2025-04-17 PROCEDURE — 80048 BASIC METABOLIC PNL TOTAL CA: CPT | Performed by: INTERNAL MEDICINE

## 2025-04-17 PROCEDURE — 83605 ASSAY OF LACTIC ACID: CPT | Performed by: INTERNAL MEDICINE

## 2025-04-17 PROCEDURE — 82805 BLOOD GASES W/O2 SATURATION: CPT | Performed by: INTERNAL MEDICINE

## 2025-04-17 PROCEDURE — 82010 KETONE BODYS QUAN: CPT | Performed by: INTERNAL MEDICINE

## 2025-04-17 PROCEDURE — 83735 ASSAY OF MAGNESIUM: CPT | Performed by: INTERNAL MEDICINE

## 2025-04-17 PROCEDURE — 36415 COLL VENOUS BLD VENIPUNCTURE: CPT | Performed by: INTERNAL MEDICINE

## 2025-04-17 PROCEDURE — 82310 ASSAY OF CALCIUM: CPT | Performed by: INTERNAL MEDICINE

## 2025-04-17 PROCEDURE — 258N000003 HC RX IP 258 OP 636: Performed by: INTERNAL MEDICINE

## 2025-04-17 PROCEDURE — 120N000004 HC R&B MS OVERFLOW

## 2025-04-17 PROCEDURE — 85027 COMPLETE CBC AUTOMATED: CPT | Performed by: INTERNAL MEDICINE

## 2025-04-17 RX ORDER — POTASSIUM CHLORIDE 1500 MG/1
20 TABLET, EXTENDED RELEASE ORAL ONCE
Status: DISCONTINUED | OUTPATIENT
Start: 2025-04-17 | End: 2025-04-17

## 2025-04-17 RX ORDER — MAGNESIUM OXIDE 400 MG/1
400 TABLET ORAL EVERY 4 HOURS
Status: COMPLETED | OUTPATIENT
Start: 2025-04-17 | End: 2025-04-17

## 2025-04-17 RX ORDER — NALOXONE HYDROCHLORIDE 0.4 MG/ML
0.4 INJECTION, SOLUTION INTRAMUSCULAR; INTRAVENOUS; SUBCUTANEOUS
Status: DISCONTINUED | OUTPATIENT
Start: 2025-04-17 | End: 2025-04-18 | Stop reason: HOSPADM

## 2025-04-17 RX ORDER — NALOXONE HYDROCHLORIDE 0.4 MG/ML
0.2 INJECTION, SOLUTION INTRAMUSCULAR; INTRAVENOUS; SUBCUTANEOUS
Status: DISCONTINUED | OUTPATIENT
Start: 2025-04-17 | End: 2025-04-18 | Stop reason: HOSPADM

## 2025-04-17 RX ORDER — POTASSIUM CHLORIDE 1500 MG/1
40 TABLET, EXTENDED RELEASE ORAL ONCE
Status: COMPLETED | OUTPATIENT
Start: 2025-04-17 | End: 2025-04-17

## 2025-04-17 RX ORDER — MAGNESIUM OXIDE 400 MG/1
400 TABLET ORAL EVERY 4 HOURS
Status: DISCONTINUED | OUTPATIENT
Start: 2025-04-17 | End: 2025-04-17

## 2025-04-17 RX ORDER — POTASSIUM CHLORIDE 1500 MG/1
20 TABLET, EXTENDED RELEASE ORAL ONCE
Status: COMPLETED | OUTPATIENT
Start: 2025-04-17 | End: 2025-04-17

## 2025-04-17 RX ADMIN — THIAMINE HCL TAB 100 MG 100 MG: 100 TAB at 17:41

## 2025-04-17 RX ADMIN — POTASSIUM CHLORIDE 20 MEQ: 1500 TABLET, EXTENDED RELEASE ORAL at 12:42

## 2025-04-17 RX ADMIN — ACETAMINOPHEN 650 MG: 325 TABLET, FILM COATED ORAL at 17:44

## 2025-04-17 RX ADMIN — MAGNESIUM OXIDE TAB 400 MG (241.3 MG ELEMENTAL MG) 400 MG: 400 (241.3 MG) TAB at 04:11

## 2025-04-17 RX ADMIN — MAGNESIUM OXIDE TAB 400 MG (241.3 MG ELEMENTAL MG) 400 MG: 400 (241.3 MG) TAB at 12:42

## 2025-04-17 RX ADMIN — POTASSIUM CHLORIDE 40 MEQ: 1500 TABLET, EXTENDED RELEASE ORAL at 00:35

## 2025-04-17 RX ADMIN — MAGNESIUM OXIDE TAB 400 MG (241.3 MG ELEMENTAL MG) 400 MG: 400 (241.3 MG) TAB at 17:41

## 2025-04-17 RX ADMIN — SODIUM PHOSPHATE, MONOBASIC, MONOHYDRATE AND SODIUM PHOSPHATE, DIBASIC, ANHYDROUS 15 MMOL: 142; 276 INJECTION, SOLUTION INTRAVENOUS at 01:47

## 2025-04-17 RX ADMIN — MAGNESIUM OXIDE TAB 400 MG (241.3 MG ELEMENTAL MG) 400 MG: 400 (241.3 MG) TAB at 00:35

## 2025-04-17 RX ADMIN — POTASSIUM & SODIUM PHOSPHATES POWDER PACK 280-160-250 MG 2 PACKET: 280-160-250 PACK at 00:35

## 2025-04-17 ASSESSMENT — ACTIVITIES OF DAILY LIVING (ADL)
ADLS_ACUITY_SCORE: 42
DEPENDENT_IADLS:: INDEPENDENT
ADLS_ACUITY_SCORE: 42
ADLS_ACUITY_SCORE: 42
ADLS_ACUITY_SCORE: 39
ADLS_ACUITY_SCORE: 42
ADLS_ACUITY_SCORE: 39
ADLS_ACUITY_SCORE: 42
ADLS_ACUITY_SCORE: 39
ADLS_ACUITY_SCORE: 39
ADLS_ACUITY_SCORE: 42
ADLS_ACUITY_SCORE: 39
ADLS_ACUITY_SCORE: 42
ADLS_ACUITY_SCORE: 39
ADLS_ACUITY_SCORE: 42
ADLS_ACUITY_SCORE: 39

## 2025-04-17 NOTE — PROGRESS NOTES
[facility]    Medicine Progress Note - Hospitalist Service    Date of Admission:  4/15/2025    Assessment & Plan   Deni France is a 49 year old female with history of  starvation ketoacidosis, latent TB, and tobacco abuse admitted on 4/15/2025 with headache, and high anion gap metabolic acidosis in the setting of severe lactic acidosis and marked ketoacidosis.    She received IV fluid and insulin drip per DKA protocol, however, hemoglobin A1c level was found to be in the normal range. Diabetic ketoacidosis is unlikely given hemoglobin A1c is 5.2% and glucose in the acceptable range.Possible etiology include starvation ketoacidosis and lactic acidosis of unclear etiology      Mixed lactic acidosis and starvation ketoacidosis  High anion gap metabolic acidosis.  She had high anion gap metabolic acidosis, as evidenced by a low bicarbonate level of 11 mmol/L, a significantly low pH of 7.16, and an elevated anion gap of 23 in the setting of severe lactic acidosis and marked ketoacidosis on admission. Diabetic ketoacidosis is unlikely given hemoglobin A1c is 5.2% and glucose in the acceptable range.     Follow-up blood culture  Follow-up consult to nutritionist  Monitor electrolyte closely and replace as needed  Follow-up repeat VBG      Intractable headache  Analgesics as needed  CT brain negative  Will consider referral to neurology clinic as outpatient    Myalgias  Likely due to electrolyte imbalances  As needed Tylenol  PT and OT  Fall precautions  Monitor replace electrolytes    Hyperkalemia  Potassium of 5.4.   Has improved to 4.9    Elevated ethanol level  Patient denies alcohol consumption.  Her blood alcohol level is 0.02.  No episode of alcohol withdrawal    Diet: Moderate Consistent Carb (60 g CHO per Meal) Diet    DVT Prophylaxis: Pneumatic Compression Devices  Kelley Catheter: Not present  Lines: None     Cardiac Monitoring: None  Code Status: Full Code      Clinically Significant Risk  Factors        # Hypokalemia: Lowest K = 3.3 mmol/L in last 2 days, will replace as needed  # Hyperkalemia: Highest K = 5.4 mmol/L in last 2 days, will monitor as appropriate  # Hyponatremia: Lowest Na = 129 mmol/L in last 2 days, will monitor as appropriate  # Hypochloremia: Lowest Cl = 90 mmol/L in last 2 days, will monitor as appropriate  # Hypocalcemia: Lowest Ca = 7.7 mg/dL in last 2 days, will monitor and replace as appropriate    # Anion Gap Metabolic Acidosis: Highest Anion Gap = 34 mmol/L in last 2 days, will monitor and treat as appropriate    # Thrombocytopenia: Lowest platelets = 128 in last 2 days, will monitor for bleeding                         Social Drivers of Health    Depression: At risk (6/14/2024)    Received from [facility]    PHQ-2     PHQ-2 Score: 3   Tobacco Use: Medium Risk (9/13/2024)    Received from [facility]    Patient History     Smoking Tobacco Use: Former     Smokeless Tobacco Use: Never          Disposition Plan     Medically Ready for Discharge: Anticipated in 2-4 Days          Cristofer Linder MD  Hospitalist Service  [facility]  Securely message with [employer] (more info)  Text page via [employer] Paging/Directory   ______________________________________________________________________    Interval History   Patient reports feeling slightly better today, with improvement in headache and pain/weakness in the neck and legs. Recent laboratory workup, including hemoglobin A1c, does not support a diagnosis of diabetes mellitus. Per patient's son, Jf patient did not any food throughout the day on the day of admission due to loss of appetite    Updated patient's son on phone about current status and plan.    Physical Exam   Vital Signs: Temp: 98  F (36.7  C) Temp src: Oral BP: 126/75 Pulse: 80   Resp: 18 SpO2: 98 % O2 Device: None (Room air)    Weight: 119 lbs 9.6 oz    General appearance: Awake, Alert, Cooperative, not in any obvious distress and appears  stated age   HEENT: Normocephalic, atraumatic, conjunctiva clear without icterus and ears without discharge  Lungs: Clear to auscultation bilaterally, no wheezing, good air exchange, normal work of breathing  Cardiovascular: Regular Rate and Rythm, normal apical impulse, normal S1 and S2, no lower extremity edema bilaterally  Abdomen: Soft, non-tender and Non-distended, active bowel sounds  Skin: Skin color, texture normal and bruising or bleeding. No rashes or lesions over face, neck, arms and legs, turgor normal.  Musculoskeletal: No bony deformities or joint tenderness. Normal ROM upon flexion & extension.   Neurologic: Alert & Oriented X 3, Facial symmetry preserved and upper & lower extremities moving well with symmetry  Psychiatric: Calm, normal eye contact and normal affect      Medical Decision Making       45 MINUTES SPENT BY ME on the date of service doing chart review, history, exam, documentation & further activities per the note.      Data     I have personally reviewed the following data over the past 24 hrs:    4.4  \   11.1 (L)   / 128 (L)     138 106 12.5 /  121 (H)   3.5 20 (L) 0.60 \     Procal: N/A CRP: N/A Lactic Acid: 0.9         Imaging results reviewed over the past 24 hrs:   No results found for this or any previous visit (from the past 24 hours).

## 2025-04-17 NOTE — PLAN OF CARE
[facility] - ICU    RN Progress Note:            Pertinent Assessments:      Please refer to flowsheet rows for full assessment     Alert and oriented, used  during assessment and communicating with treatment plan, cooperative. V/S stable, new IV inserted. Slept in between cares. Voided to the BR once.            Key Events - This Shift:       Uneventful sleep.     RN Managed Protocols Ordered:  yes  Protocols:potassium, magnesium, phosphorus  PRN'S: none  Protocols Status: replaced, recheck at 0730, discussed with oncoming RN.                 Barriers to Discharge / Downgrade:     none

## 2025-04-17 NOTE — CONSULTS
Care Management Initial Consult    General Information  Assessment completed with: Patient,    Type of CM/SW Visit: Initial Assessment    Primary Care Provider verified and updated as needed: Yes   Readmission within the last 30 days: no previous admission in last 30 days         Advance Care Planning: Advance Care Planning Reviewed:  (no HCD)          Communication Assessment  Patient's communication style: spoken language (non-English)    Hearing Difficulty or Deaf: no   Wear Glasses or Blind: no    Cognitive  Cognitive/Neuro/Behavioral: WDL  Level of Consciousness: alert  Arousal Level: opens eyes spontaneously  Orientation: oriented x 4  Mood/Behavior: calm, cooperative  Best Language: 0 - No aphasia  Speech: clear    Living Environment:   People in home: child(april), adult     Current living Arrangements: house      Able to return to prior arrangements: yes       Family/Social Support:  Care provided by: self  Provides care for: no one  Marital Status:   Support system: Children          Description of Support System: Supportive         Current Resources:   Patient receiving home care services: No        Community Resources:  (clinic SW)  Equipment currently used at home: none  Supplies currently used at home: None    Employment/Financial:  Employment Status: unemployed        Financial Concerns: insurance, none   Referral to Financial Worker: Yes       Does the patient's insurance plan have a 3 day qualifying hospital stay waiver?  No    Lifestyle & Psychosocial Needs:  Social Drivers of Health     Food Insecurity: Low Risk  (4/16/2025)    Food Insecurity     Within the past 12 months, did you worry that your food would run out before you got money to buy more?: No     Within the past 12 months, did the food you bought just not last and you didn t have money to get more?: No   Depression: At risk (6/14/2024)    Received from [facility]    PHQ-2     PHQ-2 Score: 3   Housing Stability: Low Risk   (4/16/2025)    Housing Stability     Do you have housing? : Yes     Are you worried about losing your housing?: No   Tobacco Use: Medium Risk (9/13/2024)    Received from [facility]    Patient History     Smoking Tobacco Use: Former     Smokeless Tobacco Use: Never     Passive Exposure: Not on file   Financial Resource Strain: Low Risk  (4/16/2025)    Financial Resource Strain     Within the past 12 months, have you or your family members you live with been unable to get utilities (heat, electricity) when it was really needed?: No   Alcohol Use: Not on file   Transportation Needs: Low Risk  (4/16/2025)    Transportation Needs     Within the past 12 months, has lack of transportation kept you from medical appointments, getting your medicines, non-medical meetings or appointments, work, or from getting things that you need?: No   Physical Activity: Not on file   Interpersonal Safety: Low Risk  (4/16/2025)    Interpersonal Safety     Do you feel physically and emotionally safe where you currently live?: Yes     Within the past 12 months, have you been hit, slapped, kicked or otherwise physically hurt by someone?: No     Within the past 12 months, have you been humiliated or emotionally abused in other ways by your partner or ex-partner?: No   Stress: Not on file   Social Connections: Not on file   Health Literacy: Not on file       Functional Status:  Prior to admission patient needed assistance:   Dependent ADLs:: Independent  Dependent IADLs:: Independent       Mental Health Status:          Chemical Dependency Status:                Values/Beliefs:  Spiritual, Cultural Beliefs, Scientologist Practices, Values that affect care:                 Discussed  Partnership in Safe Discharge Planning  document with patient/family: No    Additional Information:    Assessment completed with patient with assistance from  [id number]. Patient reports she lives in a house with her son Jf. She is independent with ADLs,  assisted with some IADLs and ambulates without devices. She is not working due to pain in her feet preventing standing for very long. She states her insurance lapsed and she has not been able to get it re-instated.  Son Jf is primary family contact. Family to transport at discharge.    Financial SW referral for lack of insurance.    Patient will need diabetic supplies and medications. Pharmacy liaison Beth notified. She will check into cari medications, insulin vouchers and glucometer supplies.      Next Steps:   Planning return home at discharge.  She will need cari medications and glucometer at discharge.     3:39 PM  A1 c came back 5.2.  Anticipating no need for diabetic supplies or teaching.        Yajaira Silverio RN

## 2025-04-18 VITALS
TEMPERATURE: 97.8 F | OXYGEN SATURATION: 97 % | BODY MASS INDEX: 22.79 KG/M2 | WEIGHT: 116.1 LBS | HEIGHT: 60 IN | SYSTOLIC BLOOD PRESSURE: 130 MMHG | DIASTOLIC BLOOD PRESSURE: 81 MMHG | HEART RATE: 75 BPM | RESPIRATION RATE: 18 BRPM

## 2025-04-18 VITALS
DIASTOLIC BLOOD PRESSURE: 84 MMHG | SYSTOLIC BLOOD PRESSURE: 144 MMHG | TEMPERATURE: 97.8 F | HEIGHT: 60 IN | WEIGHT: 119.6 LBS | OXYGEN SATURATION: 98 % | BODY MASS INDEX: 23.48 KG/M2 | RESPIRATION RATE: 20 BRPM | HEART RATE: 70 BPM

## 2025-04-18 LAB
ANION GAP SERPL CALCULATED.3IONS-SCNC: 10 MMOL/L (ref 7–15)
BUN SERPL-MCNC: 15.6 MG/DL (ref 6–20)
CALCIUM SERPL-MCNC: 8.8 MG/DL (ref 8.8–10.4)
CHLORIDE SERPL-SCNC: 104 MMOL/L (ref 98–107)
CREAT SERPL-MCNC: 0.39 MG/DL (ref 0.51–0.95)
EGFRCR SERPLBLD CKD-EPI 2021: >90 ML/MIN/1.73M2
GLUCOSE BLDC GLUCOMTR-MCNC: 106 MG/DL (ref 70–99)
GLUCOSE BLDC GLUCOMTR-MCNC: 111 MG/DL (ref 70–99)
GLUCOSE BLDC GLUCOMTR-MCNC: 136 MG/DL (ref 70–99)
GLUCOSE SERPL-MCNC: 106 MG/DL (ref 70–99)
HCO3 SERPL-SCNC: 23 MMOL/L (ref 22–29)
MAGNESIUM SERPL-MCNC: 2 MG/DL (ref 1.7–2.3)
PHOSPHATE SERPL-MCNC: 3.1 MG/DL (ref 2.5–4.5)
POTASSIUM SERPL-SCNC: 3.6 MMOL/L (ref 3.4–5.3)
POTASSIUM SERPL-SCNC: 3.6 MMOL/L (ref 3.4–5.3)
SODIUM SERPL-SCNC: 137 MMOL/L (ref 135–145)
T4 FREE SERPL-MCNC: 1.06 NG/DL (ref 0.9–1.7)
TSH SERPL DL<=0.005 MIU/L-ACNC: 8.54 UIU/ML (ref 0.3–4.2)

## 2025-04-18 PROCEDURE — 250N000011 HC RX IP 250 OP 636: Performed by: INTERNAL MEDICINE

## 2025-04-18 PROCEDURE — 99239 HOSP IP/OBS DSCHRG MGMT >30: CPT | Performed by: INTERNAL MEDICINE

## 2025-04-18 PROCEDURE — 84132 ASSAY OF SERUM POTASSIUM: CPT | Performed by: INTERNAL MEDICINE

## 2025-04-18 PROCEDURE — 80048 BASIC METABOLIC PNL TOTAL CA: CPT | Performed by: INTERNAL MEDICINE

## 2025-04-18 PROCEDURE — 83735 ASSAY OF MAGNESIUM: CPT | Performed by: INTERNAL MEDICINE

## 2025-04-18 PROCEDURE — 250N000013 HC RX MED GY IP 250 OP 250 PS 637: Performed by: INTERNAL MEDICINE

## 2025-04-18 PROCEDURE — 93010 ELECTROCARDIOGRAM REPORT: CPT | Performed by: INTERNAL MEDICINE

## 2025-04-18 PROCEDURE — 84443 ASSAY THYROID STIM HORMONE: CPT | Performed by: INTERNAL MEDICINE

## 2025-04-18 PROCEDURE — 93005 ELECTROCARDIOGRAM TRACING: CPT

## 2025-04-18 PROCEDURE — 36415 COLL VENOUS BLD VENIPUNCTURE: CPT | Performed by: INTERNAL MEDICINE

## 2025-04-18 PROCEDURE — 84439 ASSAY OF FREE THYROXINE: CPT | Performed by: INTERNAL MEDICINE

## 2025-04-18 PROCEDURE — 84100 ASSAY OF PHOSPHORUS: CPT | Performed by: INTERNAL MEDICINE

## 2025-04-18 RX ORDER — MAGNESIUM SULFATE HEPTAHYDRATE 40 MG/ML
2 INJECTION, SOLUTION INTRAVENOUS ONCE
Status: COMPLETED | OUTPATIENT
Start: 2025-04-18 | End: 2025-04-18

## 2025-04-18 RX ORDER — PRENATAL VIT/IRON FUM/FOLIC AC 27MG-0.8MG
1 TABLET ORAL DAILY
Qty: 90 TABLET | Refills: 3 | Status: SHIPPED | OUTPATIENT
Start: 2025-04-18

## 2025-04-18 RX ORDER — POTASSIUM CHLORIDE 1.5 G/1.58G
20 POWDER, FOR SOLUTION ORAL ONCE
Status: COMPLETED | OUTPATIENT
Start: 2025-04-18 | End: 2025-04-18

## 2025-04-18 RX ORDER — PANTOPRAZOLE SODIUM 40 MG/1
40 TABLET, DELAYED RELEASE ORAL DAILY
Qty: 30 TABLET | Refills: 0 | Status: SHIPPED | OUTPATIENT
Start: 2025-04-18

## 2025-04-18 RX ADMIN — THIAMINE HCL TAB 100 MG 100 MG: 100 TAB at 08:12

## 2025-04-18 RX ADMIN — ACETAMINOPHEN 650 MG: 325 TABLET, FILM COATED ORAL at 00:06

## 2025-04-18 RX ADMIN — MAGNESIUM SULFATE HEPTAHYDRATE 2 G: 40 INJECTION, SOLUTION INTRAVENOUS at 05:32

## 2025-04-18 RX ADMIN — POTASSIUM CHLORIDE 20 MEQ: 1.5 POWDER, FOR SOLUTION ORAL at 05:31

## 2025-04-18 ASSESSMENT — ACTIVITIES OF DAILY LIVING (ADL)
ADLS_ACUITY_SCORE: 38

## 2025-04-18 NOTE — PLAN OF CARE
Goal Outcome Evaluation:      Plan of Care Reviewed With: patient    Overall Patient Progress: improvingOverall Patient Progress: improving    Outcome Evaluation: plan to discharge today    [facility] - ICU    RN Progress Note:            Pertinent Assessments:      Please refer to flowsheet rows for full assessment     Pt is alert and oriented x3. Disoriented to time. Moderate knee pain treated with tylenol. Afebrile. VSS on RA. LS clear.            Key Events - This Shift:       Pt slept well between cares and assessments.      RN Managed Protocols Ordered:  Yes  Protocols:Potassium, Magnesium, and Phosphorus  PRN'S:  Protocols Status: Reviewed with Oncoming RN                Barriers to Discharge / Downgrade:     None

## 2025-04-18 NOTE — CONSULTS
CLINICAL NUTRITION SERVICES - ASSESSMENT NOTE    RECOMMENDATIONS FOR MDs/PROVIDERS TO ORDER:      Registered Dietitian Interventions:  Encouraged pt eat and drink even when not feeling well, including high protein foods with each meal.  (Pt eats a traditional diet)     Future/Additional Recommendations:  Pt likely discharging today     REASON FOR ASSESSMENT  Suspected starvation ketoacidosis; concern for malnutrition   (pt has been hospitalized in the past for same).  No DM Hgb A1c 5.2 (4/15/25)    Per chart, pt came in with headache, neck pain, numbness and tingling in arms and legs with weakness. Wears knee braces.  Hypoglycemic on admission.    INFORMATION OBTAINED  Assessed patient in room.    NUTRITION HISTORY  Spoke with pt with assistance of Salima  using Ipad  Pt says she usually eats all during the day meals and snacks.  She eats traditional foods, including Jenn.  Her meals always include a protein food (chicken, fish, beef, pork) , she eats eggs as well.  She eats noodles/rice and vegetables.  She drinks apple juice and water.    Writer attempted to explain that her body is acting like it has not had food or beverages, and this has happened in the past.  Pt then said she has trouble sleeping and sometimes goes for 2 full nights without sleep.  When this happens she has no appetite.  She says she runs out of medication and this is when she comes to the hospital.  Writer encouraged pt to eat/drink small amounts (protein, noodles/rice, veg, fruit) frequently even if not hungry; and when feeling good at least 2 meals per day and her snacks (which are fruits).      Provider came in to see pt and writer related information pertaining to trouble sleeping, and low appetite and running out of medication when this happens.      CURRENT NUTRITION ORDERS  Diet:  60 gm cho per meal    CURRENT INTAKE/TOLERANCE  25 -100%  100% of breakfast and lunch and dinner yesterday - full meals    LABS  Nutrition-relevant  "labs: creat 0.39  FSB, 136  Electrolyte imbalance on admission - resolved    MEDICATIONS  Nutrition-relevant medications: reviewed    ANTHROPOMETRICS  Height: 152.4 cm (5' 0\")  Admission Weight: 52.2 kg (115 lb) (04/15/25 1716)   Most Recent Weight: 52.7 kg (116 lb 1.6 oz) (25 0500)  IBW: 45.4 kg  BMI: Body mass index is 22.67 kg/m .   Weight History:   Wt Readings from Last 10 Encounters:   25 52.7 kg (116 lb 1.6 oz)   14 49.9 kg (110 lb)   14 49.9 kg (110 lb)      Dosing Weight: 52.7 kg, based on actual wt    ASSESSED NUTRITION NEEDS  Estimated Energy Needs: 1500 -1600 kcals/day ( 30 kcal/kg )  Justification: Maintenance  Estimated Protein Needs: 42-53 grams protein/day (0.8 - 1 grams of pro/kg)  Justification: Maintenance  Estimated Fluid Needs: 1600 mL/day ( 30 mL/kg ), or per Provider  Justification: Maintenance    SYSTEM AND PHYSICAL FINDINGS    Per chart,  Some teeth missing  GI symptoms: normoactive BS, last BM 4/15/25  pt did have nausea and vomiting 4/15/25   Skin/wounds: intact    MALNUTRITION  % Intake:  difficult to assess from conversation with pt  % Weight Loss: Unable to assess  Pt does not have a scale and does not keep track of her weight  Subcutaneous Fat Loss: None observed  Muscle Loss: None observed  Fluid Accumulation/Edema: None noted  Malnutrition Diagnosis: Unable to determine due to no weights for comparison and limited nutrition hx  Malnutrition Present on Admission: Unable to assess    NUTRITION DIAGNOSIS  Altered nutrition related laboratory values related to starvation ketosis as evidenced by electrolyte imbalance, elevated blood ketones, glucose 49 upon admission.    INTERVENTIONS  Nutrition education related to maintaining nutrition/hydration    GOALS  Patient to consume % of nutritionally adequate meal trays TID, or the equivalent with supplements/snacks.     MONITORING/EVALUATION  Progress toward goals will be monitored and evaluated per policy. "

## 2025-04-18 NOTE — PLAN OF CARE
Goal Outcome Evaluation:      Plan of Care Reviewed With: patient, child          Outcome Evaluation: Discharge at 1426  Discharged to: (home) at (1426)   Belongings: Sent with patient  AVS (After Visit Summary) discussed with: patient and son

## 2025-04-18 NOTE — DISCHARGE SUMMARY
[facility]  Hospitalist Discharge Summary      Date of Admission:  4/15/2025  Date of Discharge:  4/18/2025  Discharging Provider: Cristofer Linder MD  Discharge Service: Hospitalist Service    Discharge Diagnoses   Starvation ketoacidosis  Latent tuberculosis  Tobacco abuse  High anion gap metabolic acidosis  Lactic acidosis  Esophagitis  Elevated TSH    Clinically Significant Risk Factors          Follow-ups Needed After Discharge   Follow-up Appointments       Hospital Follow-up with Existing Primary Care Provider (PCP)      Follow-up at the GI clinic as arranged for for evaluation of esophagitis  Pantoprazole for now  Avoid NSAIDs  Ensure adequate oral intake  Consider starting mirtazapine if oral intake/sleep remains poor and if there is concern for depression    Schedule Primary Care visit within: 7 Days               Unresulted Labs Ordered in the Past 30 Days of this Admission       Date and Time Order Name Status Description    4/18/2025  1:43 PM TSH In process     4/17/2025  4:00 PM Blood Culture Hand, Right Preliminary     4/17/2025  4:00 PM Blood Culture Arm, Left Preliminary     4/15/2025  8:11 PM Blood Culture Peripheral Blood Preliminary     4/15/2025  8:11 PM Blood Culture Peripheral Blood Preliminary         These results will be followed up by Zakia Cervantes      Discharge Disposition   Discharged to home  Condition at discharge: Stable    Hospital Course   Deni France is a 49 year old female with history of  recurrent starvation ketoacidosis, esophagitis, latent TB, and tobacco abuse admitted on 4/15/2025 with headache, and high anion gap metabolic acidosis in the setting of severe lactic acidosis and starvation ketoacidosis.    She received IV fluid and insulin drip per DKA protocol, however, hemoglobin A1c level was found to be in the normal range. Diabetic ketoacidosis is unlikely given hemoglobin A1c is 5.2% and glucose in the acceptable range.Possible etiology include  starvation ketoacidosis and lactic acidosis of unclear etiology.  Of note, patient had similar episode in June 2023 at which time she was evaluated by diabetes service    Lactic acidosis, ketoacidosis and symptoms including headache, myalgia and hyperkalemia have resolved.      Consultations This Hospital Stay   [facility]  CARE MANAGEMENT / SOCIAL WORK IP CONSULT  NUTRITION SERVICES ADULT IP CONSULT    Code Status   Full Code    Time Spent on this Encounter   I, Cristofer Linder MD, personally saw the patient today and spent greater than 30 minutes discharging this patient.       Cristofer Linder MD  [facility] [address] [address]  Phone: [phone]  Fax: [phone]  ______________________________________________________________________    Physical Exam   Vital Signs: Temp: 97.8  F (36.6  C) Temp src: Oral BP: 130/81 Pulse: 75   Resp: 18 SpO2: 97 % O2 Device: None (Room air)    Weight: 116 lbs 1.6 oz    General appearance: Awake, Alert, Cooperative, not in any obvious distress and appears stated age   HEENT: Normocephalic, atraumatic, conjunctiva clear without icterus and ears without discharge  Lungs: Clear to auscultation bilaterally, no wheezing, good air exchange, normal work of breathing  Cardiovascular: Regular Rate and Rythm, normal apical impulse, normal S1 and S2, no lower extremity edema bilaterally  Abdomen: Soft, non-tender and Non-distended, active bowel sounds  Skin: Skin color, texture normal and bruising or bleeding. No rashes or lesions over face, neck, arms and legs, turgor normal.  Musculoskeletal: No bony deformities or joint tenderness. Normal ROM upon flexion & extension.   Neurologic: Alert & Oriented X 3, Facial symmetry preserved and upper & lower extremities moving well with symmetry  Psychiatric: Calm, normal eye contact and normal affect       Primary Care Physician   Zakia Cervantes    Discharge Orders       Folate     Adult GI  Referral - Consult Only      Reason for your hospital stay    Starvation ketoacidosis  Latent tuberculosis  Tobacco abuse  High anion gap metabolic acidosis  Lactic acidosis  Esophagitis     Activity    Your activity upon discharge: activity as tolerated     Diet    Follow this diet upon discharge: Current Diet:Orders Placed This Encounter      Moderate Consistent Carb (60 g CHO per Meal) Diet     Hospital Follow-up with Existing Primary Care Provider (PCP)    Follow-up at the GI clinic as arranged for for evaluation of esophagitis  Pantoprazole for now  Avoid NSAIDs  Ensure adequate oral intake  Consider starting mirtazapine if oral intake/sleep remains poor and if there is concern for depression       Significant Results and Procedures   Most Recent 3 CBC's:  Recent Labs   Lab Test 04/17/25  0717 04/16/25  1515 04/15/25  1929   WBC 4.4 6.8 12.2*   HGB 11.1* 10.5* 16.1*   MCV 90 89 92   * 141* 232     Most Recent 3 BMP's:  Recent Labs   Lab Test 04/18/25  1205 04/18/25  0752 04/18/25  0410 04/17/25  1740 04/17/25  1253 04/17/25  0830 04/17/25  0717 04/16/25  0810 04/16/25  0730   NA  --   --  137  --  138  --   --   --  135   POTASSIUM  --   --  3.6  3.6  --  3.5  --  3.6   < > 3.6   CHLORIDE  --   --  104  --  106  --   --   --  105   CO2  --   --  23  --  20*  --   --   --  21*   BUN  --   --  15.6  --  12.5  --   --   --  10.0   CR  --   --  0.39*  --  0.60  --   --   --  0.43*   ANIONGAP  --   --  10  --  12  --   --   --  9   VU  --   --  8.8  --  8.7*  --   --   --  7.7*   * 111* 106*   < > 121*   < >  --    < > 132*    < > = values in this interval not displayed.   ,   Results for orders placed or performed during the hospital encounter of 04/15/25   CTA Head Neck with Contrast    Narrative    EXAM: CTA HEAD NECK W CONTRAST  LOCATION: [facility]  DATE: 4/15/2025    INDICATION: Headache, n v   COMPARISON: None.  CONTRAST: 90mL ISOVUE  370  TECHNIQUE: Head and neck CT angiogram with IV contrast. Noncontrast head CT followed by axial helical CT images of the head and neck vessels obtained during the arterial phase of intravenous contrast administration. Axial 2D reconstructed images and   multiplanar 3D MIP reconstructed images of the head and neck vessels were performed by the technologist. Dose reduction techniques were used. All stenosis measurements made according to NASCET criteria unless otherwise specified.    FINDINGS:   NONCONTRAST HEAD CT:   INTRACRANIAL CONTENTS: No intracranial hemorrhage, extraaxial collection, or mass effect.  No CT evidence of acute infarct. Mild volume loss and presumed chronic small vessel ischemia.     VISUALIZED ORBITS/SINUSES/MASTOIDS: No intraorbital abnormality. No paranasal sinus mucosal disease. No middle ear or mastoid effusion.    BONES/SOFT TISSUES: No acute abnormality.    HEAD CTA: Limited by venous contamination.  ANTERIOR CIRCULATION: No stenosis/occlusion, aneurysm, or high flow vascular malformation. Standard [place] anatomy.    POSTERIOR CIRCULATION: No stenosis/occlusion, aneurysm, or high flow vascular malformation. Balanced vertebral arteries supply a normal basilar artery.     DURAL VENOUS SINUSES: Expected enhancement of the major dural venous sinuses.    NECK CTA:  RIGHT CAROTID: No measurable stenosis or dissection.    LEFT CAROTID: No measurable stenosis or dissection.    VERTEBRAL ARTERIES: No focal stenosis or dissection. Balanced vertebral arteries.    AORTIC ARCH: Classic aortic arch anatomy with no significant stenosis at the origin of the great vessels.    NONVASCULAR STRUCTURES: Unremarkable.      Impression    IMPRESSION:   HEAD CT:  1.  No acute intracranial abnormality    2.  Mild age-related and chronic ischemic changes.    HEAD CTA:   1.  Limited by venous contamination. No definite high-grade stenosis, branch occlusion, or aneurysm.    NECK CTA:  1.  Normal neck CTA.    XR Chest Port 1 View    Narrative    EXAM: XR CHEST PORT 1 VIEW  LOCATION: [facility]  DATE: 4/15/2025    INDICATION: dyspnea  COMPARISON: Chest radiograph and CT chest 6/7/2023      Impression    IMPRESSION: No focal airspace opacity or edema. No pleural effusion or pneumothorax. Stable heart size and mediastinal contours.   CT Chest Pulmonary Embolism w Contrast    Narrative    EXAM: CT CHEST PULMONARY EMBOLISM W CONTRAST, CT ABDOMEN PELVIS W CONTRAST  LOCATION: [facility]  DATE: 04/15/2025    INDICATION: High anion gap. Lactic acidosis.  COMPARISON: CTA chest, abdomen, and pelvis without/with IV contrast dissection protocol 06/07/2023.  TECHNIQUE: CT chest pulmonary angiogram during arterial phase injection of IV contrast. Multiplanar reformats and MIP reconstructions were performed. Dose reduction techniques were used. CT scan of the abdomen and pelvis was performed following injection   of IV contrast. Multiplanar reformats were obtained. Dose reduction techniques were used.  CONTRAST: 90 mL Isovue 370 IV.    FINDINGS:  ANGIOGRAM CHEST: Pulmonary arteries are normal caliber and negative for pulmonary emboli. Thoracic aorta is negative for dissection. No CT evidence of right heart strain.    LUNGS AND PLEURA: Minor dependent atelectasis in the posterior costophrenic angles. Both lungs are otherwise clear. No pleural effusion on either side.    MEDIASTINUM/AXILLAE: Normal cardiac size. No pericardial effusion. No suspicious adenopathy. Resolved mild thickening of the distal esophagus suggested previously. Trachea is midline.    CORONARY ARTERY CALCIFICATION: None.    HEPATOBILIARY: Diffusely fatty infiltrated enlarged liver, length of the right lobe measures 18 cm (image 34, series 9), unchanged. Tiny hypodense cysts or hemangiomas in the liver, largest in the lateral segment of the left lobe measuring 0.7 cm (image   26, series 5), no specific followup  required. Patent hepatic and portal veins. No calcified gallstones, biliary dilatation, or adjacent inflammation.    PANCREAS: Normal.    SPLEEN: Normal.    ADRENAL GLANDS: Normal.    KIDNEYS/BLADDER: No urinary tract calculi. Both kidneys are well-perfused without hydronephrosis or hydroureter. Normal urinary bladder.    BOWEL: Normal appendix. No mechanical bowel obstruction, free gas or free fluid.    LYMPH NODES: No suspicious abdominopelvic adenopathy.    VASCULATURE: Normal-caliber distal abdominal aorta measuring 1.5 x 1.6 cm (image 93, series 5). Normal-caliber IVC. Mesenteric and bilateral renal vessels are well-perfused.    PELVIC ORGANS: Normal uterus and both ovaries. Normal appendix. No adenopathy or free fluid.    MUSCULOSKELETAL: Mild degenerative changes lower lumbar spine and both SI joints.      Impression    IMPRESSION:  1.  No pulmonary emboli on either side. Lungs are otherwise clear. No adenopathy or effusion.    2.  Normal-caliber aorta in the chest, abdomen, and pelvis. No aneurysm or dissection. Downstream branches are well-perfused. Normal cardiac size.    3.  Diffusely fatty infiltrated enlarged liver with a few hypodense cysts or hemangiomas, which require no specific followup.    4.  No mechanical bowel obstruction, free gas or free fluid. Normal appendix.   CT Abdomen Pelvis w Contrast    Narrative    EXAM: CT CHEST PULMONARY EMBOLISM W CONTRAST, CT ABDOMEN PELVIS W CONTRAST  LOCATION: [facility]  DATE: 04/15/2025    INDICATION: High anion gap. Lactic acidosis.  COMPARISON: CTA chest, abdomen, and pelvis without/with IV contrast dissection protocol 06/07/2023.  TECHNIQUE: CT chest pulmonary angiogram during arterial phase injection of IV contrast. Multiplanar reformats and MIP reconstructions were performed. Dose reduction techniques were used. CT scan of the abdomen and pelvis was performed following injection   of IV contrast. Multiplanar reformats were  obtained. Dose reduction techniques were used.  CONTRAST: 90 mL Isovue 370 IV.    FINDINGS:  ANGIOGRAM CHEST: Pulmonary arteries are normal caliber and negative for pulmonary emboli. Thoracic aorta is negative for dissection. No CT evidence of right heart strain.    LUNGS AND PLEURA: Minor dependent atelectasis in the posterior costophrenic angles. Both lungs are otherwise clear. No pleural effusion on either side.    MEDIASTINUM/AXILLAE: Normal cardiac size. No pericardial effusion. No suspicious adenopathy. Resolved mild thickening of the distal esophagus suggested previously. Trachea is midline.    CORONARY ARTERY CALCIFICATION: None.    HEPATOBILIARY: Diffusely fatty infiltrated enlarged liver, length of the right lobe measures 18 cm (image 34, series 9), unchanged. Tiny hypodense cysts or hemangiomas in the liver, largest in the lateral segment of the left lobe measuring 0.7 cm (image   26, series 5), no specific followup required. Patent hepatic and portal veins. No calcified gallstones, biliary dilatation, or adjacent inflammation.    PANCREAS: Normal.    SPLEEN: Normal.    ADRENAL GLANDS: Normal.    KIDNEYS/BLADDER: No urinary tract calculi. Both kidneys are well-perfused without hydronephrosis or hydroureter. Normal urinary bladder.    BOWEL: Normal appendix. No mechanical bowel obstruction, free gas or free fluid.    LYMPH NODES: No suspicious abdominopelvic adenopathy.    VASCULATURE: Normal-caliber distal abdominal aorta measuring 1.5 x 1.6 cm (image 93, series 5). Normal-caliber IVC. Mesenteric and bilateral renal vessels are well-perfused.    PELVIC ORGANS: Normal uterus and both ovaries. Normal appendix. No adenopathy or free fluid.    MUSCULOSKELETAL: Mild degenerative changes lower lumbar spine and both SI joints.      Impression    IMPRESSION:  1.  No pulmonary emboli on either side. Lungs are otherwise clear. No adenopathy or effusion.    2.  Normal-caliber aorta in the chest, abdomen, and pelvis.  No aneurysm or dissection. Downstream branches are well-perfused. Normal cardiac size.    3.  Diffusely fatty infiltrated enlarged liver with a few hypodense cysts or hemangiomas, which require no specific followup.    4.  No mechanical bowel obstruction, free gas or free fluid. Normal appendix.       Discharge Medications   Current Discharge Medication List        START taking these medications    Details   pantoprazole (PROTONIX) 40 MG EC tablet Take 1 tablet (40 mg) by mouth daily.  Qty: 30 tablet, Refills: 0    Associated Diagnoses: Gastroesophageal reflux disease with esophagitis without hemorrhage      Prenatal Vit-Fe Fumarate-FA (PRENATAL MULTIVITAMIN W/IRON) 27-0.8 MG tablet Take 1 tablet by mouth daily.  Qty: 90 tablet, Refills: 3    Associated Diagnoses: Gastroesophageal reflux disease with esophagitis without hemorrhage           STOP taking these medications       ibuprofen (ADVIL/MOTRIN) 600 MG tablet Comments:   Reason for Stopping:             Allergies   No Known Allergies

## 2025-04-20 LAB — BACTERIA BLD CULT: NO GROWTH

## 2025-04-21 LAB — BACTERIA BLD CULT: NO GROWTH

## 2025-04-22 LAB
ATRIAL RATE - MUSE: 80 BPM
BACTERIA BLD CULT: NO GROWTH
BACTERIA BLD CULT: NO GROWTH
DIASTOLIC BLOOD PRESSURE - MUSE: NORMAL MMHG
INTERPRETATION ECG - MUSE: NORMAL
P AXIS - MUSE: 34 DEGREES
PR INTERVAL - MUSE: 144 MS
QRS DURATION - MUSE: 76 MS
QT - MUSE: 402 MS
QTC - MUSE: 463 MS
R AXIS - MUSE: 46 DEGREES
SYSTOLIC BLOOD PRESSURE - MUSE: NORMAL MMHG
T AXIS - MUSE: 33 DEGREES
VENTRICULAR RATE- MUSE: 80 BPM